# Patient Record
Sex: MALE | Race: WHITE | Employment: FULL TIME | URBAN - NONMETROPOLITAN AREA
[De-identification: names, ages, dates, MRNs, and addresses within clinical notes are randomized per-mention and may not be internally consistent; named-entity substitution may affect disease eponyms.]

---

## 2019-01-16 ENCOUNTER — OFFICE VISIT (OUTPATIENT)
Dept: FAMILY MEDICINE CLINIC | Age: 59
End: 2019-01-16
Payer: COMMERCIAL

## 2019-01-16 VITALS
SYSTOLIC BLOOD PRESSURE: 134 MMHG | DIASTOLIC BLOOD PRESSURE: 84 MMHG | WEIGHT: 181.4 LBS | HEIGHT: 71 IN | BODY MASS INDEX: 25.4 KG/M2 | HEART RATE: 62 BPM | RESPIRATION RATE: 16 BRPM

## 2019-01-16 DIAGNOSIS — Z23 NEED FOR TDAP VACCINATION: ICD-10-CM

## 2019-01-16 DIAGNOSIS — Z11.59 ENCOUNTER FOR HEPATITIS C SCREENING TEST FOR LOW RISK PATIENT: ICD-10-CM

## 2019-01-16 DIAGNOSIS — F17.210 CIGARETTE NICOTINE DEPENDENCE, UNCOMPLICATED: ICD-10-CM

## 2019-01-16 DIAGNOSIS — R07.9 CHEST PAIN, UNSPECIFIED TYPE: Primary | ICD-10-CM

## 2019-01-16 DIAGNOSIS — Z23 NEED FOR SHINGLES VACCINE: ICD-10-CM

## 2019-01-16 DIAGNOSIS — Z00.00 HEALTH CARE MAINTENANCE: ICD-10-CM

## 2019-01-16 DIAGNOSIS — Z87.891 PERSONAL HISTORY OF TOBACCO USE: ICD-10-CM

## 2019-01-16 DIAGNOSIS — Z82.49 FAMILY HISTORY OF CARDIOMYOPATHY: ICD-10-CM

## 2019-01-16 DIAGNOSIS — Z87.891 PERSONAL HISTORY OF TOBACCO USE, PRESENTING HAZARDS TO HEALTH: ICD-10-CM

## 2019-01-16 PROCEDURE — 93000 ELECTROCARDIOGRAM COMPLETE: CPT | Performed by: INTERNAL MEDICINE

## 2019-01-16 PROCEDURE — 99204 OFFICE O/P NEW MOD 45 MIN: CPT | Performed by: FAMILY MEDICINE

## 2019-01-16 PROCEDURE — 99406 BEHAV CHNG SMOKING 3-10 MIN: CPT | Performed by: FAMILY MEDICINE

## 2019-01-16 PROCEDURE — G0296 VISIT TO DETERM LDCT ELIG: HCPCS | Performed by: FAMILY MEDICINE

## 2019-01-16 RX ORDER — IBUPROFEN 200 MG
200 TABLET ORAL EVERY 6 HOURS PRN
COMMUNITY
End: 2021-08-19

## 2019-01-16 RX ORDER — VARENICLINE TARTRATE 25 MG
KIT ORAL
Qty: 1 EACH | Refills: 0 | Status: SHIPPED | OUTPATIENT
Start: 2019-01-16 | End: 2021-04-15

## 2019-01-16 ASSESSMENT — PATIENT HEALTH QUESTIONNAIRE - PHQ9
SUM OF ALL RESPONSES TO PHQ QUESTIONS 1-9: 0
2. FEELING DOWN, DEPRESSED OR HOPELESS: 0
1. LITTLE INTEREST OR PLEASURE IN DOING THINGS: 0
SUM OF ALL RESPONSES TO PHQ QUESTIONS 1-9: 0
SUM OF ALL RESPONSES TO PHQ9 QUESTIONS 1 & 2: 0

## 2019-01-18 ENCOUNTER — TELEPHONE (OUTPATIENT)
Dept: ONCOLOGY | Age: 59
End: 2019-01-18

## 2019-01-23 ENCOUNTER — TELEPHONE (OUTPATIENT)
Dept: FAMILY MEDICINE CLINIC | Age: 59
End: 2019-01-23

## 2019-01-23 DIAGNOSIS — Z82.49 FAMILY HISTORY OF CARDIOMYOPATHY: Primary | ICD-10-CM

## 2019-01-23 DIAGNOSIS — R07.9 CHEST PAIN, UNSPECIFIED TYPE: ICD-10-CM

## 2019-01-24 ENCOUNTER — HOSPITAL ENCOUNTER (OUTPATIENT)
Dept: CT IMAGING | Age: 59
Discharge: HOME | End: 2019-01-26

## 2019-01-24 DIAGNOSIS — Z87.891 PERSONAL HISTORY OF TOBACCO USE: ICD-10-CM

## 2019-01-24 PROCEDURE — G0297 LDCT FOR LUNG CA SCREEN: HCPCS

## 2019-01-26 ENCOUNTER — HOSPITAL ENCOUNTER (OUTPATIENT)
Dept: LAB | Age: 59
Discharge: HOME | End: 2019-01-26
Payer: COMMERCIAL

## 2019-01-26 DIAGNOSIS — Z00.00 HEALTH CARE MAINTENANCE: ICD-10-CM

## 2019-01-26 DIAGNOSIS — Z11.59 ENCOUNTER FOR HEPATITIS C SCREENING TEST FOR LOW RISK PATIENT: ICD-10-CM

## 2019-01-26 LAB
ALBUMIN SERPL-MCNC: 4 G/DL (ref 3.5–5.2)
ALBUMIN/GLOBULIN RATIO: 1.5 (ref 1–2.5)
ALP BLD-CCNC: 60 U/L (ref 40–129)
ALT SERPL-CCNC: 11 U/L (ref 5–41)
ANION GAP SERPL CALCULATED.3IONS-SCNC: 10 MMOL/L (ref 9–17)
AST SERPL-CCNC: 13 U/L
BILIRUB SERPL-MCNC: 0.31 MG/DL (ref 0.3–1.2)
BUN BLDV-MCNC: 12 MG/DL (ref 6–20)
BUN/CREAT BLD: 15 (ref 9–20)
CALCIUM SERPL-MCNC: 9.3 MG/DL (ref 8.6–10.4)
CHLORIDE BLD-SCNC: 108 MMOL/L (ref 98–107)
CHOLESTEROL, FASTING: 137 MG/DL
CHOLESTEROL/HDL RATIO: 3.5
CO2: 26 MMOL/L (ref 20–31)
CREAT SERPL-MCNC: 0.79 MG/DL (ref 0.7–1.2)
GFR AFRICAN AMERICAN: >60 ML/MIN
GFR NON-AFRICAN AMERICAN: >60 ML/MIN
GFR SERPL CREATININE-BSD FRML MDRD: ABNORMAL ML/MIN/{1.73_M2}
GFR SERPL CREATININE-BSD FRML MDRD: ABNORMAL ML/MIN/{1.73_M2}
GLUCOSE BLD-MCNC: 99 MG/DL (ref 70–99)
HDLC SERPL-MCNC: 39 MG/DL
HEPATITIS C ANTIBODY: NONREACTIVE
LDL CHOLESTEROL: 79 MG/DL (ref 0–130)
POTASSIUM SERPL-SCNC: 4.3 MMOL/L (ref 3.7–5.3)
SODIUM BLD-SCNC: 144 MMOL/L (ref 135–144)
TOTAL PROTEIN: 6.6 G/DL (ref 6.4–8.3)
TRIGLYCERIDE, FASTING: 93 MG/DL
VLDLC SERPL CALC-MCNC: ABNORMAL MG/DL (ref 1–30)

## 2019-01-26 PROCEDURE — 86803 HEPATITIS C AB TEST: CPT

## 2019-01-26 PROCEDURE — 80061 LIPID PANEL: CPT

## 2019-01-26 PROCEDURE — 36415 COLL VENOUS BLD VENIPUNCTURE: CPT

## 2019-01-26 PROCEDURE — 80053 COMPREHEN METABOLIC PANEL: CPT

## 2019-02-01 ENCOUNTER — OFFICE VISIT (OUTPATIENT)
Dept: PRIMARY CARE CLINIC | Age: 59
End: 2019-02-01
Payer: COMMERCIAL

## 2019-02-01 VITALS
BODY MASS INDEX: 25.97 KG/M2 | SYSTOLIC BLOOD PRESSURE: 136 MMHG | OXYGEN SATURATION: 97 % | HEIGHT: 70 IN | TEMPERATURE: 100.2 F | WEIGHT: 181.4 LBS | HEART RATE: 70 BPM | DIASTOLIC BLOOD PRESSURE: 82 MMHG

## 2019-02-01 DIAGNOSIS — R50.9 FEVER, UNSPECIFIED FEVER CAUSE: ICD-10-CM

## 2019-02-01 DIAGNOSIS — J11.1 INFLUENZA-LIKE ILLNESS: Primary | ICD-10-CM

## 2019-02-01 LAB
INFLUENZA A ANTIBODY: NEGATIVE
INFLUENZA B ANTIBODY: NEGATIVE

## 2019-02-01 PROCEDURE — 99213 OFFICE O/P EST LOW 20 MIN: CPT | Performed by: NURSE PRACTITIONER

## 2019-02-01 PROCEDURE — 87804 INFLUENZA ASSAY W/OPTIC: CPT | Performed by: NURSE PRACTITIONER

## 2019-02-01 ASSESSMENT — ENCOUNTER SYMPTOMS
SORE THROAT: 0
ABDOMINAL PAIN: 0
NAUSEA: 0
FLU SYMPTOMS: 1
VOMITING: 0
RESPIRATORY NEGATIVE: 1
GASTROINTESTINAL NEGATIVE: 1
COUGH: 0

## 2019-02-06 ENCOUNTER — OFFICE VISIT (OUTPATIENT)
Dept: CARDIOLOGY | Age: 59
End: 2019-02-06
Payer: COMMERCIAL

## 2019-02-06 VITALS
SYSTOLIC BLOOD PRESSURE: 112 MMHG | BODY MASS INDEX: 25.77 KG/M2 | DIASTOLIC BLOOD PRESSURE: 70 MMHG | WEIGHT: 180 LBS | HEIGHT: 70 IN | HEART RATE: 68 BPM

## 2019-02-06 DIAGNOSIS — Z82.49 FAMILY HISTORY OF CARDIOMYOPATHY: ICD-10-CM

## 2019-02-06 DIAGNOSIS — F17.200 SMOKING: ICD-10-CM

## 2019-02-06 DIAGNOSIS — R06.09 DYSPNEA ON EXERTION: Primary | ICD-10-CM

## 2019-02-06 PROCEDURE — 99244 OFF/OP CNSLTJ NEW/EST MOD 40: CPT | Performed by: INTERNAL MEDICINE

## 2019-03-20 ENCOUNTER — HOSPITAL ENCOUNTER (OUTPATIENT)
Dept: NON INVASIVE DIAGNOSTICS | Age: 59
Discharge: HOME | End: 2019-03-20
Payer: COMMERCIAL

## 2019-03-20 ENCOUNTER — HOSPITAL ENCOUNTER (OUTPATIENT)
Dept: NUCLEAR MEDICINE | Age: 59
Discharge: HOME | End: 2019-03-22
Payer: COMMERCIAL

## 2019-03-20 ENCOUNTER — HOSPITAL ENCOUNTER (OUTPATIENT)
Dept: NUCLEAR MEDICINE | Age: 59
End: 2019-03-20
Payer: COMMERCIAL

## 2019-03-20 DIAGNOSIS — R06.09 DYSPNEA ON EXERTION: ICD-10-CM

## 2019-03-20 LAB
LV EF: 55 %
LVEF MODALITY: NORMAL

## 2019-03-20 PROCEDURE — 93017 CV STRESS TEST TRACING ONLY: CPT

## 2019-03-20 PROCEDURE — A9500 TC99M SESTAMIBI: HCPCS | Performed by: INTERNAL MEDICINE

## 2019-03-20 PROCEDURE — 3430000000 HC RX DIAGNOSTIC RADIOPHARMACEUTICAL: Performed by: INTERNAL MEDICINE

## 2019-03-20 PROCEDURE — 78452 HT MUSCLE IMAGE SPECT MULT: CPT

## 2019-03-20 PROCEDURE — 93306 TTE W/DOPPLER COMPLETE: CPT | Performed by: INTERNAL MEDICINE

## 2019-03-20 PROCEDURE — 93018 CV STRESS TEST I&R ONLY: CPT | Performed by: INTERNAL MEDICINE

## 2019-03-20 PROCEDURE — 93306 TTE W/DOPPLER COMPLETE: CPT

## 2019-03-20 RX ADMIN — TETRAKIS(2-METHOXYISOBUTYLISOCYANIDE)COPPER(I) TETRAFLUOROBORATE 30 MILLICURIE: 1 INJECTION, POWDER, LYOPHILIZED, FOR SOLUTION INTRAVENOUS at 11:49

## 2019-03-20 RX ADMIN — TETRAKIS(2-METHOXYISOBUTYLISOCYANIDE)COPPER(I) TETRAFLUOROBORATE 10 MILLICURIE: 1 INJECTION, POWDER, LYOPHILIZED, FOR SOLUTION INTRAVENOUS at 11:49

## 2019-04-24 ENCOUNTER — OFFICE VISIT (OUTPATIENT)
Dept: CARDIOLOGY | Age: 59
End: 2019-04-24
Payer: COMMERCIAL

## 2019-04-24 VITALS
DIASTOLIC BLOOD PRESSURE: 82 MMHG | HEIGHT: 70 IN | HEART RATE: 68 BPM | BODY MASS INDEX: 26.48 KG/M2 | WEIGHT: 185 LBS | SYSTOLIC BLOOD PRESSURE: 134 MMHG

## 2019-04-24 DIAGNOSIS — R06.09 DYSPNEA ON EXERTION: Primary | ICD-10-CM

## 2019-04-24 PROCEDURE — 99213 OFFICE O/P EST LOW 20 MIN: CPT | Performed by: INTERNAL MEDICINE

## 2019-04-24 NOTE — PROGRESS NOTES
estimated EF > 55%. No segmental wall motion abnormalities seen. No doppler evidence of diastolic dysfunction. No significant valvular regurgitation or stenosis seen. No pericardial effusion. Nuclear stress test 3/20/19  IMPRESSION:  1. No ischemia or infarction. 2. Normal LV systolic function. LVEF 58%. Assessment and plan:    -Dyspnea on exertion and family history of cardiomyopathy. Nuclear stress test was normal 3/20/19. TTE 3/20/19 showed preserved LV systolic function with no significant valve issues.   -Chronic smoking- Counseled to quit smoking. Patient to have pulmonary evaluation by PCP  -RTC 12 months.       Leah Lopez [facility]           [phone]

## 2020-02-05 ENCOUNTER — TELEPHONE (OUTPATIENT)
Dept: ONCOLOGY | Age: 60
End: 2020-02-05

## 2020-02-05 NOTE — TELEPHONE ENCOUNTER
Working on Lung Nodule Follow up Report. Chart reviewed. Our records indicate that your patient is due for their annual lung cancer screening follow up testing. For your convenience, we have pended the order for the scan for you. If you do not agree with the need for the test, please cancel the order and let us know.      Sincerely,    [address] [facility]

## 2020-02-05 NOTE — LETTER
2/5/2020        [address] [address]    Dear Rina Apple:    Your healthcare provider has ordered a low dose CT scan of the chest for lung cancer screening. You will find enclosed, information about CT lung screening. Please review the statement of understanding, you will be asked to sign a copy of this at the time of your CT scan    If you have not already been contacted to make the appointment for your scan, please call our scheduling department at [phone]    Keep in mind that CT lung screening does not take the place of smoking cessation. If you are a current smoker, you will find enclosed smoking cessation resources. Please do not hesitate to contact me if you have any questions or concerns.     [address] [place] [address],      [facility] Lung Screening Program  [phone]

## 2020-02-14 ENCOUNTER — HOSPITAL ENCOUNTER (OUTPATIENT)
Dept: CT IMAGING | Age: 60
Discharge: HOME | End: 2020-02-16
Payer: COMMERCIAL

## 2020-02-14 PROCEDURE — G0297 LDCT FOR LUNG CA SCREEN: HCPCS

## 2020-04-23 ENCOUNTER — TELEMEDICINE (OUTPATIENT)
Dept: CARDIOLOGY | Age: 60
End: 2020-04-23
Payer: COMMERCIAL

## 2020-04-23 PROCEDURE — 99214 OFFICE O/P EST MOD 30 MIN: CPT | Performed by: INTERNAL MEDICINE

## 2020-04-23 NOTE — PROGRESS NOTES
Abnormal   [x] Mouth/Throat: Mucous membranes are moist.     External Ears [x] Normal  [] Abnormal-     Neck: [x] No visualized mass     Pulmonary/Chest: [x] Respiratory effort normal.  [x] No visualized signs of difficulty breathing or respiratory distress        [] Abnormal-      Musculoskeletal:   [x] Normal gait with no signs of ataxia         [x] Normal range of motion of neck        [] Abnormal-       Neurological:        [x] No Facial Asymmetry (Cranial nerve 7 motor function) (limited exam to video visit)          [x] No gaze palsy        [] Abnormal-         Skin:        [x] No significant exanthematous lesions or discoloration noted on facial skin         [] Abnormal-            Psychiatric:       [x] Normal Affect [x] No Hallucinations        [] Abnormal-     Other pertinent observable physical exam findings-       ASSESSMENT/PLAN:    Dyspnea on exertion and family history of cardiomyopathy. Resolved. Nuclear stress test was normal 3/20/19. TTE 3/20/19 showed preserved LV systolic function with no significant valve issues. Chronic smoking- Counseled to quit smoking. Patient to have pulmonary evaluation by PCP. Trying to quit  RTC 12 months. Joseph Pena is a 63 y.o. male being evaluated by a Virtual Visit (video visit) encounter to address concerns as mentioned above. A caregiver was present when appropriate. Due to this being a TeleHealth encounter (During [facility] public health emergency), evaluation of the following organ systems was limited: Vitals/Constitutional/EENT/Resp/CV/GI//MS/Neuro/Skin/Heme-Lymph-Imm. Pursuant to the emergency declaration under the [address] [place] [address] authority and the [employer] Act, this Virtual Visit was conducted with patient's (and/or legal guardian's) consent, to reduce the patient's risk of exposure to COVID-19 and provide necessary medical care.   The patient (and/or legal guardian) has also been advised to contact this office for worsening conditions or problems, and seek emergency medical treatment and/or call 911 if deemed necessary. Services were provided through a video synchronous discussion virtually to substitute for in-person clinic visit. Patient and provider were located at their individual homes. --Rach Torres DO on 4/23/2020 at 9:12 AM    An electronic signature was used to authenticate this note.

## 2021-03-29 ENCOUNTER — PATIENT MESSAGE (OUTPATIENT)
Dept: FAMILY MEDICINE CLINIC | Age: 61
End: 2021-03-29

## 2021-03-29 DIAGNOSIS — Z87.891 PERSONAL HISTORY OF NICOTINE DEPENDENCE: Primary | ICD-10-CM

## 2021-03-30 NOTE — TELEPHONE ENCOUNTER
From: Liss Spencer  To: Nelsy Quintero MD  Sent: 3/29/2021 6:10 PM EDT  Subject: Non-Urgent Medical Question    I received a letter that it is time for my lung screening.  Can Dr Destini Riojas call this in please

## 2021-04-05 ENCOUNTER — TELEPHONE (OUTPATIENT)
Dept: ONCOLOGY | Age: 61
End: 2021-04-05

## 2021-04-05 NOTE — LETTER
4/5/2021        [address], [facility].    Dear Jennifer Rudd:    Your healthcare provider has ordered a low dose CT scan of the chest for lung cancer screening. You will find enclosed, information about CT lung screening. Please review the statement of understanding, you will be asked to sign a copy of this at the time of your CT scan    If you have not already been contacted to make the appointment for your scan, please call our scheduling department at [phone]    Keep in mind that CT lung screening does not take the place of smoking cessation. If you are a current smoker, you will find enclosed smoking cessation resources. Please do not hesitate to contact me if you have any questions or concerns.     [facility],      [facility] Lung Screening Program  [phone]

## 2021-04-09 ENCOUNTER — HOSPITAL ENCOUNTER (OUTPATIENT)
Dept: CT IMAGING | Age: 61
Discharge: HOME | End: 2021-04-11
Payer: COMMERCIAL

## 2021-04-09 DIAGNOSIS — Z87.891 PERSONAL HISTORY OF NICOTINE DEPENDENCE: ICD-10-CM

## 2021-04-09 PROCEDURE — 71271 CT THORAX LUNG CANCER SCR C-: CPT

## 2021-04-15 ENCOUNTER — OFFICE VISIT (OUTPATIENT)
Dept: FAMILY MEDICINE CLINIC | Age: 61
End: 2021-04-15
Payer: COMMERCIAL

## 2021-04-15 VITALS
RESPIRATION RATE: 16 BRPM | WEIGHT: 203.6 LBS | SYSTOLIC BLOOD PRESSURE: 128 MMHG | HEART RATE: 82 BPM | HEIGHT: 71 IN | BODY MASS INDEX: 28.5 KG/M2 | DIASTOLIC BLOOD PRESSURE: 86 MMHG

## 2021-04-15 DIAGNOSIS — R10.10 PAIN OF UPPER ABDOMEN: ICD-10-CM

## 2021-04-15 DIAGNOSIS — Z00.00 HEALTHCARE MAINTENANCE: ICD-10-CM

## 2021-04-15 DIAGNOSIS — Z00.00 ANNUAL VISIT FOR GENERAL ADULT MEDICAL EXAMINATION WITHOUT ABNORMAL FINDINGS: Primary | ICD-10-CM

## 2021-04-15 PROBLEM — Z87.891 HISTORY OF TOBACCO USE: Status: ACTIVE | Noted: 2019-01-16

## 2021-04-15 PROCEDURE — 99214 OFFICE O/P EST MOD 30 MIN: CPT | Performed by: FAMILY MEDICINE

## 2021-04-15 PROCEDURE — 93000 ELECTROCARDIOGRAM COMPLETE: CPT | Performed by: FAMILY MEDICINE

## 2021-04-15 RX ORDER — OMEPRAZOLE 20 MG/1
20 CAPSULE, DELAYED RELEASE ORAL
Qty: 30 CAPSULE | Refills: 0 | Status: SHIPPED | OUTPATIENT
Start: 2021-04-15

## 2021-04-15 ASSESSMENT — PATIENT HEALTH QUESTIONNAIRE - PHQ9
SUM OF ALL RESPONSES TO PHQ QUESTIONS 1-9: 0
SUM OF ALL RESPONSES TO PHQ9 QUESTIONS 1 & 2: 0

## 2021-04-15 NOTE — PROGRESS NOTES
[address] [place], [address]                        Telephone [phone]             Fax [phone]  1960  MRN:  [id number]  Date of visit:  4/15/2021    Subjective:    Kathy Sesay is a 62 y.o. male who presents to [facility] today (4/15/2021) for follow up/evaluation of: Annual Exam and Abdominal Pain    He is here today for an annual exam.  He reports intermittent abdominal pain in the upper abdomen. He reports an aching sensation that occurs before a more intense pain starts. He gets diaphoretic when he has the pain. The pain will last from 5 minutes to 60 minutes. He occasionally has nausea and shortness of breath associated with the pain. He is uncertain if it associated with eating. It occurs more often with exertion, or when the weather is warmer. The last episode occurred on Easter. He had CT scan of his lungs 04/09/2021 per lung cancer screening protocol. The CT showed a hiatal hernia. He reports occasional GERD symptoms. He uses Pepcid prn. He has quit smoking. There is a family history of heart disease. He had a brother that had an MI at age 37. Danny had a nuclear stress test 3/20/2019 that was normal.  He has received both doses of the [employer] vaccine.     He has the following problem list:  Patient Active Problem List   Diagnosis    Tobacco abuse    Cigarette nicotine dependence, uncomplicated    Family history of cardiomyopathy    Dyspnea on exertion        Current medications are:  Outpatient Medications Marked as Taking for the 4/15/21 encounter (Office Visit) with Carolynne Severance, MD   Medication Sig Dispense Refill    aspirin 81 MG tablet Take 81 mg by mouth daily      ibuprofen (ADVIL;MOTRIN) 200 MG tablet Take 200 mg by mouth every 6 hours as needed for Pain      FAMOTIDINE PO Take by mouth as needed He is allergic to sulfa antibiotics. He is not currently a smoker. He  reports that he has quit smoking. His smoking use included cigarettes. He has a 30.00 pack-year smoking history. He quit smokeless tobacco use about 4 months ago. Objective:    Vitals:    04/15/21 1631   BP: 128/86   Site: Right Upper Arm   Position: Sitting   Cuff Size: Large Adult   Pulse: 82   Resp: 16   Weight: 203 lb 9.6 oz (92.4 kg)   Height: 5' 11\" (1.803 m)     Body mass index is 28.4 kg/m². Overweight male, healthy-appearing, alert, cooperative and in no acute distress. Neck supple. No adenopathy. Thyroid symmetric, normal size. Chest:  Normal expansion. Clear to auscultation. No rales, rhonchi, or wheezing. Heart sounds are normal.  Regular rate and rhythm without murmur, gallop or rub. There is mild tenderness to palpation of the chest wall. Abdomen is soft, with mild epigastric tenderness to palpation. There is no rebound or guarding. There are no masses palpated. Lower extremities have no edema. He has had no recent labs. ECG done today showed sinus bradycardia with a rate of 56. Assessment and Plan:    1. Annual visit for general adult medical examination without abnormal findings  Health maintenance was reviewed with the patient. He has received both doses of the [employer] Covid-19 vaccine. Lipid panel and fasting comprehensive metabolic panel were recommended and ordered. He is due for Pneumovax, Tdap, and Shingrix, but he just received the second dose of his Covid-19 vaccine. HIV screening was recommended and declined. All other health maintenance is up to date at this time. There is no indication for Prevnar-13 at this time. 2. Pain of upper abdomen  Angina vs. GERD vs. other  I reviewed the ECG done today with the patient. I have recommended follow up with cardiology. He has an appointment scheduled on 5/5/2021.   He was advised to go to the ER if he has severe pain, or pain associated

## 2021-04-15 NOTE — PATIENT INSTRUCTIONS
Patient Education        Chest Pain: Care Instructions  Your Care Instructions     There are many things that can cause chest pain. Some are not serious and will get better on their own in a few days. But some kinds of chest pain need more testing and treatment. Your doctor may have recommended a follow-up visit in the next 8 to 12 hours. If you are not getting better, you may need more tests or treatment. Even though your doctor has released you, you still need to watch for any problems. The doctor carefully checked you, but sometimes problems can develop later. If you have new symptoms or if your symptoms do not get better, get medical care right away. If you have worse or different chest pain or pressure that lasts more than 5 minutes or you passed out (lost consciousness), call 911 or seek other emergency help right away. A medical visit is only one step in your treatment. Even if you feel better, you still need to do what your doctor recommends, such as going to all suggested follow-up appointments and taking medicines exactly as directed. This will help you recover and help prevent future problems. How can you care for yourself at home? · Rest until you feel better. · Take your medicine exactly as prescribed. Call your doctor if you think you are having a problem with your medicine. · Do not drive after taking a prescription pain medicine. When should you call for help? Call 911 if:     · You passed out (lost consciousness).     · You have severe difficulty breathing.     · You have symptoms of a heart attack. These may include:  ? Chest pain or pressure, or a strange feeling in your chest.  ? Sweating. ? Shortness of breath. ? Nausea or vomiting. ? Pain, pressure, or a strange feeling in your back, neck, jaw, or upper belly or in one or both shoulders or arms. ? Lightheadedness or sudden weakness. ? A fast or irregular heartbeat.   After you call 911, the  may tell you to chew 1

## 2021-04-24 ENCOUNTER — HOSPITAL ENCOUNTER (OUTPATIENT)
Dept: LAB | Age: 61
Discharge: HOME | End: 2021-04-24
Payer: COMMERCIAL

## 2021-04-24 DIAGNOSIS — Z00.00 HEALTHCARE MAINTENANCE: ICD-10-CM

## 2021-04-24 LAB
ALBUMIN SERPL-MCNC: 4.4 G/DL (ref 3.5–5.2)
ALBUMIN/GLOBULIN RATIO: 1.5 (ref 1–2.5)
ALP BLD-CCNC: 83 U/L (ref 40–129)
ALT SERPL-CCNC: 16 U/L (ref 5–41)
ANION GAP SERPL CALCULATED.3IONS-SCNC: 8 MMOL/L (ref 9–17)
AST SERPL-CCNC: 16 U/L
BILIRUB SERPL-MCNC: 0.41 MG/DL (ref 0.3–1.2)
BUN BLDV-MCNC: 16 MG/DL (ref 6–20)
BUN/CREAT BLD: 20 (ref 9–20)
CALCIUM SERPL-MCNC: 9.8 MG/DL (ref 8.6–10.4)
CHLORIDE BLD-SCNC: 106 MMOL/L (ref 98–107)
CHOLESTEROL, FASTING: 161 MG/DL
CHOLESTEROL/HDL RATIO: 3.4
CO2: 27 MMOL/L (ref 20–31)
CREAT SERPL-MCNC: 0.79 MG/DL (ref 0.7–1.2)
GFR AFRICAN AMERICAN: >60 ML/MIN
GFR NON-AFRICAN AMERICAN: >60 ML/MIN
GFR SERPL CREATININE-BSD FRML MDRD: ABNORMAL ML/MIN/{1.73_M2}
GFR SERPL CREATININE-BSD FRML MDRD: ABNORMAL ML/MIN/{1.73_M2}
GLUCOSE BLD-MCNC: 95 MG/DL (ref 70–99)
HDLC SERPL-MCNC: 48 MG/DL
LDL CHOLESTEROL: 86 MG/DL (ref 0–130)
POTASSIUM SERPL-SCNC: 4.7 MMOL/L (ref 3.7–5.3)
SODIUM BLD-SCNC: 141 MMOL/L (ref 135–144)
TOTAL PROTEIN: 7.3 G/DL (ref 6.4–8.3)
TRIGLYCERIDE, FASTING: 135 MG/DL
VLDLC SERPL CALC-MCNC: NORMAL MG/DL (ref 1–30)

## 2021-04-24 PROCEDURE — 80061 LIPID PANEL: CPT

## 2021-04-24 PROCEDURE — 36415 COLL VENOUS BLD VENIPUNCTURE: CPT

## 2021-04-24 PROCEDURE — 80053 COMPREHEN METABOLIC PANEL: CPT

## 2021-05-05 ENCOUNTER — OFFICE VISIT (OUTPATIENT)
Dept: CARDIOLOGY | Age: 61
End: 2021-05-05
Payer: COMMERCIAL

## 2021-05-05 VITALS
DIASTOLIC BLOOD PRESSURE: 99 MMHG | SYSTOLIC BLOOD PRESSURE: 145 MMHG | BODY MASS INDEX: 28.42 KG/M2 | WEIGHT: 203 LBS | HEART RATE: 72 BPM | HEIGHT: 71 IN

## 2021-05-05 DIAGNOSIS — R06.09 DYSPNEA ON EXERTION: Primary | ICD-10-CM

## 2021-05-05 DIAGNOSIS — F17.200 SMOKING: ICD-10-CM

## 2021-05-05 DIAGNOSIS — I10 ESSENTIAL HYPERTENSION: ICD-10-CM

## 2021-05-05 DIAGNOSIS — I42.9 CARDIOMYOPATHY, UNSPECIFIED TYPE (HCC): ICD-10-CM

## 2021-05-05 PROCEDURE — 93000 ELECTROCARDIOGRAM COMPLETE: CPT | Performed by: INTERNAL MEDICINE

## 2021-05-05 PROCEDURE — 99214 OFFICE O/P EST MOD 30 MIN: CPT | Performed by: INTERNAL MEDICINE

## 2021-05-05 RX ORDER — LISINOPRIL 10 MG/1
10 TABLET ORAL DAILY
Qty: 30 TABLET | Refills: 5 | Status: SHIPPED | OUTPATIENT
Start: 2021-05-05 | End: 2021-09-24 | Stop reason: SDUPTHER

## 2021-05-05 NOTE — PROGRESS NOTES
level, No change in activity level. · Eyes: No visual changes or diplopia. No scleral icterus. · ENT: No Headaches, hearing loss or vertigo. No mouth sores or sore throat. · Cardiovascular: see above  · Respiratory: see above  · Gastrointestinal: No abdominal pain, appetite loss, blood in stools. · Genitourinary: No dysuria, trouble voiding, or hematuria. · Musculoskeletal:  No gait disturbance, No weakness or joint complaints. · Integumentary: No rash or pruritis. · Neurological: No headache or diplopia. No tingling  · Psychiatric: No anxiety, or depression. · Endocrine: No temperature intolerance. · Hematologic/Lymphatic: No abnormal bruising or bleeding, blood clots or swollen lymph nodes. · Allergic/Immunologic: No nasal congestion or hives. PHYSICAL EXAM:      Vitals:    05/05/21 0938   BP: (!) 164/94   Pulse: 70       Constitutional and General Appearance: alert, cooperative, no distress and appears stated age  HEENT: PERRL, no cervical lymphadenopathy. No masses palpable. Normal oral mucosa  Respiratory:  · Normal excursion and expansion without use of accessory muscles  · Resp Auscultation: Good respiratory effort. No for increased work of breathing. On auscultation: clear to auscultation bilaterally  Cardiovascular:  · Heart tones are crisp and normal. regular S1 and S2.  · Jugular venous pulsation Normal  · The carotid upstroke is normal in amplitude and contour without delay or bruit   Abdomen:   · soft  · Bowel sounds present  Extremities:  ·  No edema  Neurological:  · Alert and oriented. Cardiac Data:  EKG: NSR, NL ECG    Labs:     CBC: No results for input(s): WBC, HGB, HCT, PLT in the last 72 hours. BMP: No results for input(s): NA, K, CO2, BUN, CREATININE, LABGLOM, GLUCOSE in the last 72 hours. PT/INR: No results for input(s): PROTIME, INR in the last 72 hours.   FASTING LIPID PANEL:  Lab Results   Component Value Date    HDL 48 04/24/2021     LIVER PROFILE:No results for input(s): AST, ALT, LABALBU in the last 72 hours. TTE 3/20/19  Summary  Normal left ventricle size, wall thickness and function with an estimated EF > 55%. No segmental wall motion abnormalities seen. No doppler evidence of diastolic dysfunction. No significant valvular regurgitation or stenosis seen. No pericardial effusion.     Nuclear stress test 3/20/19  IMPRESSION:  1. No ischemia or infarction. 2. Normal LV systolic function. LVEF 58%.       Assessment and plan:     -Dyspnea on exertion and family history of cardiomyopathy. I will obtain treadmill cardiolite stress test to evaluate his symptoms. I will also obtain TTE to evaluate for LV function, valve abnormalities and pulmonary hypertension. I will also obtain PFT due to history of smoking. -HTN- will start lisinopril 10mg po qday. Patient to monitor BP at home. Side effects discussed. -GERD- on PPI  -Chronic smoking- he stopped smoking Thanksgiving 2020.    -I counseled him extensively towards symptoms for which he will need to seek emergent medical attention.   -RTC 6 weeks    Leah Obrien [facility]           [phone]

## 2021-05-12 ENCOUNTER — PRE-PROCEDURE TELEPHONE (OUTPATIENT)
Dept: PREADMISSION TESTING | Age: 61
End: 2021-05-12

## 2021-05-20 ENCOUNTER — HOSPITAL ENCOUNTER (OUTPATIENT)
Dept: PREADMISSION TESTING | Age: 61
Setting detail: SPECIMEN
Discharge: HOME | End: 2021-05-24
Payer: COMMERCIAL

## 2021-05-20 DIAGNOSIS — Z11.59 ENCOUNTER FOR SCREENING FOR OTHER VIRAL DISEASES: ICD-10-CM

## 2021-05-25 ENCOUNTER — HOSPITAL ENCOUNTER (OUTPATIENT)
Dept: PULMONOLOGY | Age: 61
Discharge: HOME | End: 2021-05-25
Payer: COMMERCIAL

## 2021-05-25 DIAGNOSIS — R06.09 DYSPNEA ON EXERTION: ICD-10-CM

## 2021-05-25 DIAGNOSIS — F17.200 SMOKING: ICD-10-CM

## 2021-05-25 PROCEDURE — 94060 EVALUATION OF WHEEZING: CPT

## 2021-05-25 PROCEDURE — 94726 PLETHYSMOGRAPHY LUNG VOLUMES: CPT

## 2021-05-25 PROCEDURE — 94640 AIRWAY INHALATION TREATMENT: CPT

## 2021-05-25 PROCEDURE — 6370000000 HC RX 637 (ALT 250 FOR IP): Performed by: INTERNAL MEDICINE

## 2021-05-25 PROCEDURE — 94729 DIFFUSING CAPACITY: CPT

## 2021-05-25 RX ORDER — ALBUTEROL SULFATE 90 UG/1
4 AEROSOL, METERED RESPIRATORY (INHALATION) ONCE
Status: COMPLETED | OUTPATIENT
Start: 2021-05-25 | End: 2021-05-25

## 2021-05-25 RX ADMIN — ALBUTEROL SULFATE 4 PUFF: 90 AEROSOL, METERED RESPIRATORY (INHALATION) at 16:13

## 2021-05-27 NOTE — PROCEDURES
[facility] [address]                 [address] [place] [address]                               PULMONARY FUNCTION    PATIENT NAME: Alice Jameson                      :        1960  MED REC NO:   [id number]                             ROOM:  ACCOUNT NO:   [de-identified]                           ADMIT DATE: 2021  PROVIDER:     Nathalie Goldman    DATE OF PROCEDURE:  2021    The patient's spirometry shows a mild obstructive and restrictive  pattern. FEV1 69% predicted with 1% bronchodilator change to 71%  predicted, FVC 71% predicted with 6% bronchodilator change to 75%  predicted. FEV1/FVC ratio is 76, postbronchodilator is 72. FEF 25-75 is 67% predicted with 1% bronchodilator change to 68%  predicted. Total lung capacity by body box 95% predicted and RV is 154% predicted  consistent with air trapping. Diffusion capacity uncorrected 76% predicted, corrected for alveolar  volume is 89% predicted. Airway resistance is normal.    FINAL IMPRESSION:  The study shows a combined obstructive and  restrictive ventilatory dysfunction of moderate severity without a  significant bronchodilator response. The restrictive process is  extrapulmonic. Clinical correlation advised.         Shelly Summers    D: 2021 10:06:52       T: 2021 18:10:07     [email or web address]  Job#: [id number]     Doc#: [id number]    CC:  Olga Love MD

## 2021-05-28 NOTE — RESULT ENCOUNTER NOTE
Please advise the patient that the PFTs did not show any significant issue with the lungs. The hiatal hernia may be contributing to shortness of breath. I recommend follow up with a surgeon. He should complete cardiology evaluation also. (He saw Dr. Liliana Mccoy 5/5/2021, and a stress test was ordered. )

## 2021-06-02 ENCOUNTER — TELEPHONE (OUTPATIENT)
Dept: FAMILY MEDICINE CLINIC | Age: 61
End: 2021-06-02

## 2021-06-02 DIAGNOSIS — K44.9 HIATAL HERNIA: Primary | ICD-10-CM

## 2021-06-09 ENCOUNTER — HOSPITAL ENCOUNTER (OUTPATIENT)
Dept: LAB | Age: 61
Discharge: HOME | End: 2021-06-09
Payer: COMMERCIAL

## 2021-06-09 ENCOUNTER — INITIAL CONSULT (OUTPATIENT)
Dept: SURGERY | Age: 61
End: 2021-06-09
Payer: COMMERCIAL

## 2021-06-09 ENCOUNTER — TELEPHONE (OUTPATIENT)
Dept: SURGERY | Age: 61
End: 2021-06-09

## 2021-06-09 VITALS
HEART RATE: 65 BPM | HEIGHT: 71 IN | DIASTOLIC BLOOD PRESSURE: 80 MMHG | WEIGHT: 202 LBS | BODY MASS INDEX: 28.28 KG/M2 | TEMPERATURE: 98.2 F | SYSTOLIC BLOOD PRESSURE: 118 MMHG | OXYGEN SATURATION: 96 %

## 2021-06-09 DIAGNOSIS — R53.83 FATIGUE, UNSPECIFIED TYPE: ICD-10-CM

## 2021-06-09 DIAGNOSIS — R13.14 PHARYNGOESOPHAGEAL DYSPHAGIA: Primary | ICD-10-CM

## 2021-06-09 DIAGNOSIS — R06.09 DYSPNEA ON EXERTION: ICD-10-CM

## 2021-06-09 DIAGNOSIS — R10.13 EPIGASTRIC PAIN: ICD-10-CM

## 2021-06-09 DIAGNOSIS — Z87.891 HISTORY OF TOBACCO USE: ICD-10-CM

## 2021-06-09 DIAGNOSIS — R07.9 CHEST PAIN, UNSPECIFIED TYPE: ICD-10-CM

## 2021-06-09 DIAGNOSIS — R13.14 PHARYNGOESOPHAGEAL DYSPHAGIA: ICD-10-CM

## 2021-06-09 LAB
ABSOLUTE EOS #: 0.29 K/UL (ref 0–0.44)
ABSOLUTE IMMATURE GRANULOCYTE: 0.04 K/UL (ref 0–0.3)
ABSOLUTE LYMPH #: 2.93 K/UL (ref 1.1–3.7)
ABSOLUTE MONO #: 0.93 K/UL (ref 0.1–1.2)
BASOPHILS # BLD: 0 % (ref 0–2)
BASOPHILS ABSOLUTE: <0.03 K/UL (ref 0–0.2)
DIFFERENTIAL TYPE: NORMAL
EOSINOPHILS RELATIVE PERCENT: 3 % (ref 1–4)
HCT VFR BLD CALC: 42.7 % (ref 40.7–50.3)
HEMOGLOBIN: 14.2 G/DL (ref 13–17)
IMMATURE GRANULOCYTES: 0 %
LYMPHOCYTES # BLD: 31 % (ref 24–43)
MCH RBC QN AUTO: 29.3 PG (ref 25.2–33.5)
MCHC RBC AUTO-ENTMCNC: 33.3 G/DL (ref 25.2–33.5)
MCV RBC AUTO: 88.2 FL (ref 82.6–102.9)
MONOCYTES # BLD: 10 % (ref 3–12)
NRBC AUTOMATED: 0 PER 100 WBC
PDW BLD-RTO: 13.2 % (ref 11.8–14.4)
PLATELET # BLD: 184 K/UL (ref 138–453)
PLATELET ESTIMATE: NORMAL
PMV BLD AUTO: 11.3 FL (ref 8.1–13.5)
RBC # BLD: 4.84 M/UL (ref 4.21–5.77)
RBC # BLD: NORMAL 10*6/UL
SEG NEUTROPHILS: 56 % (ref 36–65)
SEGMENTED NEUTROPHILS ABSOLUTE COUNT: 5.12 K/UL (ref 1.5–8.1)
WBC # BLD: 9.3 K/UL (ref 3.5–11.3)
WBC # BLD: NORMAL 10*3/UL

## 2021-06-09 PROCEDURE — 85025 COMPLETE CBC W/AUTO DIFF WBC: CPT

## 2021-06-09 PROCEDURE — 99204 OFFICE O/P NEW MOD 45 MIN: CPT | Performed by: SURGERY

## 2021-06-09 PROCEDURE — 36415 COLL VENOUS BLD VENIPUNCTURE: CPT

## 2021-06-09 ASSESSMENT — ENCOUNTER SYMPTOMS
BACK PAIN: 0
TROUBLE SWALLOWING: 1
COUGH: 0
HEMATOCHEZIA: 0
EYES NEGATIVE: 1
NAUSEA: 1
SHORTNESS OF BREATH: 1
VOMITING: 0
SORE THROAT: 0
HEARTBURN: 1
CHOKING: 0
ABDOMINAL PAIN: 1
DIARRHEA: 1
WHEEZING: 0
VOICE CHANGE: 0

## 2021-06-09 NOTE — TELEPHONE ENCOUNTER
Provider   lisinopril (PRINIVIL;ZESTRIL) 10 MG tablet Take 1 tablet by mouth daily 5/5/21   Elijah Mcmillan MD   omeprazole (PRILOSEC) 20 MG delayed release capsule Take 1 capsule by mouth every morning (before breakfast) 4/15/21   Royer Mcgraw MD   aspirin 81 MG tablet Take 81 mg by mouth daily    Historical Provider, MD   ibuprofen (ADVIL;MOTRIN) 200 MG tablet Take 200 mg by mouth every 6 hours as needed for Pain    Historical Provider, MD     Vital Signs (Current) [unfilled]    Weight:   Wt Readings from Last 1 Encounters:   05/05/21 203 lb (92.1 kg)     Height:   Ht Readings from Last 1 Encounters:   05/05/21 5' 11\" (1.803 m)      BMI:  There is no height or weight on file to calculate BMI. Estimated body mass index is 28.17 kg/m² as calculated from the following:    Height as of an earlier encounter on 6/9/21: 5' 11\" (1.803 m). Weight as of an earlier encounter on 6/9/21: 202 lb (91.6 kg). body mass index is unknown because there is no height or weight on file. Cardiac Clearance: None   Medical Clearance:None   Appointment for surgery Clearance scheduled for:None     Preoperative Testing: These are the current and completed labs:  CBC: No results found for: WBC, RBC, HGB, HCT, MCV, RDW, PLT  CMP:   Lab Results   Component Value Date     04/24/2021    K 4.7 04/24/2021     04/24/2021    CO2 27 04/24/2021    BUN 16 04/24/2021    CREATININE 0.79 04/24/2021    GFRAA >60 04/24/2021    LABGLOM >60 04/24/2021    GLUCOSE 95 04/24/2021    PROT 7.3 04/24/2021    CALCIUM 9.8 04/24/2021    BILITOT 0.41 04/24/2021    ALKPHOS 83 04/24/2021    AST 16 04/24/2021    ALT 16 04/24/2021     POC Tests: No results for input(s): POCGLU, POCNA, POCK, POCCL, POCBUN, POCHEMO, POCHCT in the last 72 hours.   Coags  No results found for: PROTIME, INR, APTT  HCG (If Applicable) No results found for: PREGTESTUR, PREGSERUM, HCG, HCGQUANT   ABGs No results found for: PHART, PO2ART, QDN4LIB, LNS5TED, BEART, Q9DRJRIL Type & Screen (If Applicable)  No results found for: Matt Ba    Additional ordered pre-operative testing:  []CBC    []ABG      [] BMP   []URINALYSIS   []CMP    []HCG   []COAGS PT/INR  []T&C  []LFTs   []TYPE AND SCREEN    [] EKG  [] Chest X-Ray  [] Other Radiology    [] Sent to Hospitalist None  [] Sent to Anesthesia for your review: CRNAs        Comments:Med hold sent to Dr. Rebolledo Crew   Requests: None    Signed:  Pj Topete LPN 4/5/3469 8:53 PM

## 2021-06-09 NOTE — PROGRESS NOTES
Abdominal Pain  This is a recurrent problem. The current episode started more than 1 year ago. Episode frequency: Once a week or less. Duration: 5 - 60 minutes. The pain is located in the epigastric region. The pain is severe. Quality: Get to \"hurtin' bad\". Sharp perhaps. He isn't sure. The abdominal pain does not radiate. Associated symptoms include diarrhea and nausea. Pertinent negatives include no anorexia, arthralgias, dysuria, fever, frequency, headaches, hematochezia, melena, myalgias, vomiting or weight loss. The pain is aggravated by movement (when he is active, when he works a lot). Relieved by: resting. His past medical history is significant for GERD. Gastroesophageal Reflux  He complains of abdominal pain, chest pain, dysphagia, heartburn ( occasionally, the pain is not heart burn) and nausea. He reports no choking, no coughing, no sore throat or no wheezing. Episode frequency: Gets heart burn 2 times a week. Has problems swallowing every time he eats. The heartburn duration is several minutes. The heartburn is located in the substernum. The heartburn is of moderate intensity. The symptoms are aggravated by certain foods. Associated symptoms include fatigue. Pertinent negatives include no melena or weight loss. Risk factors include smoking/tobacco exposure. Food doesn't seem to affect it. Happen twice last Saturday while he was mowing and doing other yard work, lasting 45 minutes and 20 minutes. Went away after her rested. Has been having difficulty breathing for the last 6 months or so. Rarely takes ibuprofen or acetaminophen. Omeprazole has not helped so far. Started in March.     Past Medical History:   Diagnosis Date    Hx of measles     Hx of mumps     Tobacco abuse      Past Surgical History:   Procedure Laterality Date    APPENDECTOMY  2003    CHOLECYSTECTOMY  2010    COLONOSCOPY  04/05/2012    1 polyp (hyperplastic), Dr. Jacki Romo, [facility]     Current General: Abdomen is flat. Bowel sounds are normal. There is no distension. Palpations: Abdomen is soft. There is no mass. Tenderness: There is no abdominal tenderness. There is no guarding or rebound. Hernia: No hernia is present. Musculoskeletal:      Cervical back: Normal range of motion. No rigidity or tenderness. Lymphadenopathy:      Head:      Right side of head: No submental, submandibular, tonsillar, preauricular, posterior auricular or occipital adenopathy. Left side of head: No submental, submandibular, tonsillar, preauricular or posterior auricular adenopathy. Cervical: No cervical adenopathy. Right cervical: No superficial or deep cervical adenopathy. Left cervical: No superficial, deep or posterior cervical adenopathy. Upper Body:      Right upper body: No supraclavicular, axillary or epitrochlear adenopathy. Left upper body: No supraclavicular, axillary or epitrochlear adenopathy. Lower Body: No right inguinal adenopathy. Skin:     General: Skin is warm and dry. Capillary Refill: Capillary refill takes less than 2 seconds. Nails: There is no clubbing. Neurological:      General: No focal deficit present. Mental Status: He is alert and oriented to person, place, and time. Chemistry profile was normal  CT of chest from April 1. Stable pulmonary nodules measuring 3-4 mm in the left lung as detailed   above.  Mild emphysema.  Old granulomatous disease. 2. Prior cholecystectomy.  Fatty liver. 3. Small hiatal hernia. EKG normal    IMP/PLAN  1) Sever Episodic Epigastric and Chest Pain  2) Ongoing shortness of breath  3) Progressive dysphagia  4) Heartburn separate form #1    Patient is a fairly active man for about a year now is been experiencing some worrisome symptoms. He has been having frequent episodes of shortness of breath these have persisted despite him quitting smoking November of last year.  He is also noticing

## 2021-06-09 NOTE — TELEPHONE ENCOUNTER
[facility]         Patient:Danny Talavera           :1960           Surgical/Procedure Planned: EGD    Date & Location: [facility]       Outpatient   Planned Length of OR: 30minutes    Sedation: general      Estimated Cardiac Risk for Non-Cardiac Surgery/Procedure     Low______ Moderate______ High______    Medication Instructions - Clarification needed by this date:     ASA 81 mg/325 mg Hold ___ Days    Resume medications:     Lovenox indicated: _____Yes _____NO    Provider:Dr. HOOKS      Signature of Provider Giving Orders for Medication holds:    _____________________________________________

## 2021-06-14 ENCOUNTER — HOSPITAL ENCOUNTER (OUTPATIENT)
Dept: NON INVASIVE DIAGNOSTICS | Age: 61
Discharge: HOME | End: 2021-06-14
Payer: COMMERCIAL

## 2021-06-14 ENCOUNTER — HOSPITAL ENCOUNTER (OUTPATIENT)
Dept: NUCLEAR MEDICINE | Age: 61
Discharge: HOME | End: 2021-06-16
Payer: COMMERCIAL

## 2021-06-14 DIAGNOSIS — I42.9 CARDIOMYOPATHY, UNSPECIFIED TYPE (HCC): ICD-10-CM

## 2021-06-14 DIAGNOSIS — R06.09 DYSPNEA ON EXERTION: ICD-10-CM

## 2021-06-14 LAB
LV EF: 70 %
LVEF MODALITY: NORMAL

## 2021-06-14 PROCEDURE — 93018 CV STRESS TEST I&R ONLY: CPT | Performed by: INTERNAL MEDICINE

## 2021-06-14 PROCEDURE — A9500 TC99M SESTAMIBI: HCPCS | Performed by: INTERNAL MEDICINE

## 2021-06-14 PROCEDURE — 3430000000 HC RX DIAGNOSTIC RADIOPHARMACEUTICAL: Performed by: INTERNAL MEDICINE

## 2021-06-14 PROCEDURE — 93306 TTE W/DOPPLER COMPLETE: CPT

## 2021-06-14 PROCEDURE — 93017 CV STRESS TEST TRACING ONLY: CPT

## 2021-06-14 PROCEDURE — 78452 HT MUSCLE IMAGE SPECT MULT: CPT

## 2021-06-14 RX ADMIN — TETRAKIS(2-METHOXYISOBUTYLISOCYANIDE)COPPER(I) TETRAFLUOROBORATE 10 MILLICURIE: 1 INJECTION, POWDER, LYOPHILIZED, FOR SOLUTION INTRAVENOUS at 08:15

## 2021-06-14 RX ADMIN — TETRAKIS(2-METHOXYISOBUTYLISOCYANIDE)COPPER(I) TETRAFLUOROBORATE 30 MILLICURIE: 1 INJECTION, POWDER, LYOPHILIZED, FOR SOLUTION INTRAVENOUS at 09:25

## 2021-06-14 NOTE — PROGRESS NOTES
Patient Name:  Shalini Wesley MRN:  [id number]   :  1960  Age:  62 y.o. Sex: male   Ordering Provider: Karoline Valdivia MD  Referring Provider: Roly Penn MD  Primary Care Provider:  Roly Penn MD     Indications: HOOKER and cardiomyopathy   Medications:     Current Outpatient Medications:     lisinopril (PRINIVIL;ZESTRIL) 10 MG tablet, Take 1 tablet by mouth daily, Disp: 30 tablet, Rfl: 5    omeprazole (PRILOSEC) 20 MG delayed release capsule, Take 1 capsule by mouth every morning (before breakfast), Disp: 30 capsule, Rfl: 0    aspirin 81 MG tablet, Take 81 mg by mouth daily, Disp: , Rfl:     ibuprofen (ADVIL;MOTRIN) 200 MG tablet, Take 200 mg by mouth every 6 hours as needed for Pain, Disp: , Rfl:   No current facility-administered medications for this encounter.     Facility-Administered Medications Ordered in Other Encounters:     technetium sestamibi (CARDIOLITE) injection 30 millicurie, 30 millicurie, Intravenous, ONCE PRN, Karoline Valdivia MD    technetium sestamibi (CARDIOLITE) injection 10 millicurie, 10 millicurie, Intravenous, ONCE PRN, Karoline Valdivia MD     ------------------------------------------------------------------------------------------------    Predicted Heart Rate:  Maximum:  161   85%:  137     Heart Rate Restin   Standin     Blood Pressure Restin/80  Standin/80     Exercise Protocol Used:  KAREN    Exercise Duration/Stage:  4:00 (II)     Test terminated due to: target heart rate reached and SOB  Imaging: Cardiolite     Maximum Heart Rate:  151 (93%) Max BP: 190/80  Max Workload: 5.80 METS     Chest Pain: No                   Onset:  N/A  Severity:  N/A     Electronically signed by Home Harmon RCP on 21 at 9:16 AM EDT    -------------------------------------------------------------------------------------------------  Baseline EKG :  Sinus bradycardia    Maximum Change:   Borderline 1-2 mm partially horizontal ST depression (II/III/AVF/V5/V6). Arrhythmias: none     Interpretation:   - Borderline 1-2 mm partially horizontal ST depression (II/III/AVF/V5/V6). - See Nuclear Portion    Supervising Physician:   Pamela Vegas [facility]  [facility]  [phone]

## 2021-06-14 NOTE — PROCEDURES
ischemia/infarction. 3. Normal left ventricular ejection fraction 61% with normal wall  motion.             Sonu Way DO    D: 06/14/2021 15:40:38       T: 06/14/2021 16:42:50     /RORY_EDIT  Job#: [id number]     Doc#: Unknown    CC:  Masha Diallo MD

## 2021-06-15 ENCOUNTER — TELEPHONE (OUTPATIENT)
Dept: CARDIOLOGY | Age: 61
End: 2021-06-15

## 2021-06-15 PROCEDURE — 93018 CV STRESS TEST I&R ONLY: CPT | Performed by: INTERNAL MEDICINE

## 2021-06-16 ENCOUNTER — OFFICE VISIT (OUTPATIENT)
Dept: CARDIOLOGY | Age: 61
End: 2021-06-16
Payer: COMMERCIAL

## 2021-06-16 ENCOUNTER — TELEPHONE (OUTPATIENT)
Dept: SURGERY | Age: 61
End: 2021-06-16

## 2021-06-16 VITALS
DIASTOLIC BLOOD PRESSURE: 79 MMHG | HEART RATE: 67 BPM | HEIGHT: 70 IN | BODY MASS INDEX: 28.92 KG/M2 | SYSTOLIC BLOOD PRESSURE: 124 MMHG | WEIGHT: 202 LBS

## 2021-06-16 DIAGNOSIS — R06.09 DYSPNEA ON EXERTION: Primary | ICD-10-CM

## 2021-06-16 DIAGNOSIS — I10 ESSENTIAL HYPERTENSION: ICD-10-CM

## 2021-06-16 DIAGNOSIS — R94.39 ABNORMAL STRESS ECG WITH TREADMILL: ICD-10-CM

## 2021-06-16 PROCEDURE — 99214 OFFICE O/P EST MOD 30 MIN: CPT | Performed by: INTERNAL MEDICINE

## 2021-06-16 NOTE — PROGRESS NOTES
Today's Date: 6/16/2021  Patient's Name: Mojgan Mendosa  Patient's age: 62 y.o., 1960    Subjective:  Mojgan Mendosa is being seen in clinic today regarding   Chief Complaint   Patient presents with    Hypertension     increased BP at last visit, needs approval for ASA hold- EGD Monday   pre op evaluation      he is here for pre op evaluation. He reports stable dyspnea on exertion. Feels better after initiation of BP medication. He denies any chest pain. TTE and stress test done and noted below. No PND, no syncope or pre-syncope, no orthopnea. Past Medical History:   has a past medical history of Hx of measles, Hx of mumps, and Tobacco abuse. Past Surgical History:   has a past surgical history that includes Appendectomy (2003); Cholecystectomy (2010); and Colonoscopy (04/05/2012). Home Medications:  Prior to Admission medications    Medication Sig Start Date End Date Taking? Authorizing Provider   lisinopril (PRINIVIL;ZESTRIL) 10 MG tablet Take 1 tablet by mouth daily 5/5/21  Yes Jarred Garcias MD   omeprazole (PRILOSEC) 20 MG delayed release capsule Take 1 capsule by mouth every morning (before breakfast) 4/15/21  Yes Judson Silva MD   aspirin 81 MG tablet Take 81 mg by mouth daily   Yes Historical Provider, MD   ibuprofen (ADVIL;MOTRIN) 200 MG tablet Take 200 mg by mouth every 6 hours as needed for Pain  Patient not taking: Reported on 6/16/2021    Historical Provider, MD       Allergies:  Sulfa antibiotics    Social History:   reports that he has quit smoking. His smoking use included cigarettes. He has a 60.00 pack-year smoking history. He quit smokeless tobacco use about 6 months ago. He reports current alcohol use. He reports that he does not use drugs.      Family History: family history includes Asthma in his father; Breast Cancer in his mother; Colon Cancer in his mother; Heart Disease in his brother; Heart Disease (age of onset: 37) in his brother; High Blood Pressure in his brother, brother, and sister; Other in his father. No h/o sudden cardiac death. No for premature CAD    REVIEW OF SYSTEMS:    · Constitutional: there has been no unanticipated weight loss. There's been No change in energy level, No change in activity level. · Eyes: No visual changes or diplopia. No scleral icterus. · ENT: No Headaches, hearing loss or vertigo. No mouth sores or sore throat. · Cardiovascular: see above  · Respiratory: see above  · Gastrointestinal: No abdominal pain, appetite loss, blood in stools. · Genitourinary: No dysuria, trouble voiding, or hematuria. · Musculoskeletal:  No gait disturbance, No weakness or joint complaints. · Integumentary: No rash or pruritis. · Neurological: No headache or diplopia. No tingling  · Psychiatric: No anxiety, or depression. · Endocrine: No temperature intolerance. · Hematologic/Lymphatic: No abnormal bruising or bleeding, blood clots or swollen lymph nodes. · Allergic/Immunologic: No nasal congestion or hives. PHYSICAL EXAM:      /79 (Site: Right Upper Arm, Position: Sitting, Cuff Size: Medium Adult)   Pulse 67   Ht 5' 10\" (1.778 m)   Wt 202 lb (91.6 kg)   BMI 28.98 kg/m²    Constitutional and General Appearance: alert, cooperative, no distress and appears stated age  HEENT: PERRL, no cervical lymphadenopathy. No masses palpable. Normal oral mucosa  Respiratory:  · Normal excursion and expansion without use of accessory muscles  · Resp Auscultation: Good respiratory effort. No for increased work of breathing. On auscultation: clear to auscultation bilaterally  Cardiovascular:  · Heart tones are crisp and normal. regular S1 and S2.  · Jugular venous pulsation Normal  · The carotid upstroke is normal in amplitude and contour without delay or bruit   Abdomen:   · soft  · Bowel sounds present  Extremities:  ·  No edema  Neurological:  · Alert and oriented.       Labs:     CBC: No results for input(s): WBC, HGB, HCT, PLT in the last 72 months.     Leah Aguero [facility]  [phone]

## 2021-06-16 NOTE — TELEPHONE ENCOUNTER
[address] [facility] [address]  Dept: [phone]  Loc: [phone]        Graciela Daniels MD           6/16/2021 (void after 30 days)           Re:  Mary Horvath             1960           Procedure/Surgery: EGD     Mary Rued is at low cardiac risk, but acceptable for the planned surgical procedure. He may proceed accordingly.        Special Instructions:     [x]? Patient is on ASA.   They will need to stop this medication 5 days prior to surgery and resume after.        Sincerely,       Graciela Daniels MD

## 2021-06-23 RX ORDER — NITROGLYCERIN 0.4 MG/1
0.4 TABLET SUBLINGUAL ONCE
Status: CANCELLED | OUTPATIENT
Start: 2021-06-23

## 2021-06-23 RX ORDER — SODIUM CHLORIDE 9 MG/ML
INJECTION, SOLUTION INTRAVENOUS CONTINUOUS
Status: CANCELLED | OUTPATIENT
Start: 2021-06-23

## 2021-06-25 ENCOUNTER — TELEPHONE (OUTPATIENT)
Dept: CARDIOLOGY | Age: 61
End: 2021-06-25

## 2021-06-25 NOTE — TELEPHONE ENCOUNTER
Pt has CTA coronary that was ordered and it was denied. Fariba Bridges from pre-cert called to ask if Dr Dejuan Reyes wants to do  a peer to peer or do something else instead. Please call to advise.      [phone]

## 2021-06-28 ENCOUNTER — HOSPITAL ENCOUNTER (OUTPATIENT)
Dept: CT IMAGING | Age: 61
Discharge: HOME | End: 2021-06-30
Payer: COMMERCIAL

## 2021-06-28 VITALS
DIASTOLIC BLOOD PRESSURE: 48 MMHG | SYSTOLIC BLOOD PRESSURE: 137 MMHG | HEART RATE: 57 BPM | RESPIRATION RATE: 16 BRPM | OXYGEN SATURATION: 100 %

## 2021-06-28 DIAGNOSIS — R06.09 DYSPNEA ON EXERTION: ICD-10-CM

## 2021-06-28 DIAGNOSIS — R94.39 ABNORMAL STRESS ECG WITH TREADMILL: ICD-10-CM

## 2021-06-28 PROCEDURE — 75574 CT ANGIO HRT W/3D IMAGE: CPT

## 2021-06-28 PROCEDURE — 6360000004 HC RX CONTRAST MEDICATION: Performed by: INTERNAL MEDICINE

## 2021-06-28 PROCEDURE — 2580000003 HC RX 258: Performed by: INTERNAL MEDICINE

## 2021-06-28 PROCEDURE — 6370000000 HC RX 637 (ALT 250 FOR IP): Performed by: INTERNAL MEDICINE

## 2021-06-28 RX ORDER — SODIUM CHLORIDE 9 MG/ML
INJECTION, SOLUTION INTRAVENOUS CONTINUOUS
Status: DISCONTINUED | OUTPATIENT
Start: 2021-06-28 | End: 2021-07-01 | Stop reason: HOSPADM

## 2021-06-28 RX ORDER — NITROGLYCERIN 0.4 MG/1
0.4 TABLET SUBLINGUAL ONCE
Status: COMPLETED | OUTPATIENT
Start: 2021-06-28 | End: 2021-06-28

## 2021-06-28 RX ORDER — METOPROLOL TARTRATE 50 MG/1
100 TABLET, FILM COATED ORAL ONCE
Status: COMPLETED | OUTPATIENT
Start: 2021-06-28 | End: 2021-06-28

## 2021-06-28 RX ORDER — SODIUM CHLORIDE 0.9 % (FLUSH) 0.9 %
10 SYRINGE (ML) INJECTION PRN
Status: DISCONTINUED | OUTPATIENT
Start: 2021-06-28 | End: 2021-07-01 | Stop reason: HOSPADM

## 2021-06-28 RX ORDER — 0.9 % SODIUM CHLORIDE 0.9 %
80 INTRAVENOUS SOLUTION INTRAVENOUS ONCE
Status: COMPLETED | OUTPATIENT
Start: 2021-06-28 | End: 2021-06-28

## 2021-06-28 RX ADMIN — SODIUM CHLORIDE, PRESERVATIVE FREE 10 ML: 5 INJECTION INTRAVENOUS at 12:17

## 2021-06-28 RX ADMIN — IOPAMIDOL 95 ML: 755 INJECTION, SOLUTION INTRAVENOUS at 12:17

## 2021-06-28 RX ADMIN — METOPROLOL TARTRATE 100 MG: 100 TABLET, FILM COATED ORAL at 10:49

## 2021-06-28 RX ADMIN — SODIUM CHLORIDE: 9 INJECTION, SOLUTION INTRAVENOUS at 10:50

## 2021-06-28 RX ADMIN — SODIUM CHLORIDE 80 ML: 9 INJECTION, SOLUTION INTRAVENOUS at 12:17

## 2021-06-28 RX ADMIN — NITROGLYCERIN 0.4 MG: 0.4 TABLET, ORALLY DISINTEGRATING SUBLINGUAL at 12:00

## 2021-06-28 NOTE — PROGRESS NOTES
Coronary CTA complete. Pt tolerated test well, IV taken out and pt discharged ambulatory. No distress noted.

## 2021-06-28 NOTE — TELEPHONE ENCOUNTER
Left message on voicemail for pt to call asap. Call had been transferred from pre service who had [place] on line with pt prior to receiving approval. Pt was at testing facility and left due to prior auth not complete yet and is driving home. Will see if pt can still have test today.

## 2021-06-28 NOTE — PROGRESS NOTES
Pt to IR, coronary CTA accepted by pts insurance. Test explained to pt. Questions answered. Pt hooked up to monitor, IV started. HR 67-77. 100mg po metoprolol given per protocol. HR down to 61-63. /93 to 114/53.

## 2021-06-28 NOTE — TELEPHONE ENCOUNTER
I called JOSE M back to ask for the additional information and they do not have a case number or ID for the peer to peer.  All that's needed is    Name    Member insurance ID # [id number]    Ph # to call is [phone]

## 2021-08-18 ENCOUNTER — TELEPHONE (OUTPATIENT)
Dept: FAMILY MEDICINE CLINIC | Age: 61
End: 2021-08-18

## 2021-08-18 NOTE — TELEPHONE ENCOUNTER
----- Message from Jo Bonilladl sent at 8/18/2021  3:11 PM EDT -----  Subject: Appointment Request    Reason for Call: Routine Joint Pain    QUESTIONS  Type of Appointment? Established Patient  Reason for appointment request? Available appointments did not meet   patient need  Additional Information for Provider? Joint pain off and on x's 1 month,   screened green, ok to see other pcp  ---------------------------------------------------------------------------  --------------  [address]  What is the best way for the office to contact you? OK to leave message on   voicemail  Preferred Call Back Phone Number? [phone]  ---------------------------------------------------------------------------  --------------  SCRIPT ANSWERS  Relationship to Patient? Other  Representative Name? Dinora Giles  Additional information verified (besides Name and Date of Birth)? Address  Did you have an injury or trauma within the past 3 days? No  Is your joint red or swollen? No  Are you having new onset numbness, tingling, or weakness with the pain? No  Did you have an injury or trauma within the past 14 days? No  Did your pain begin within the past week? No  Are you having fevers (100.4), chills, or sweats? No  (Is the patient requesting to be seen urgently for their symptoms?)? No  Have you recently (14 days) seen a provider for this issue? No  Have you been diagnosed with, awaiting test results for, or told that you   are suspected of having COVID-19 (Coronavirus)? (If patient has tested   negative or was tested as a requirement for work, school, or travel and   not based on symptoms, answer no)? No  Do you currently have flu-like symptoms including fever or chills, cough,   shortness of breath, difficulty breathing, or new loss of taste or smell? No  Have you had close contact with someone with COVID-19 in the last 14 days? No  (Service Expert  click yes below to proceed with [employer] As Usual   Scheduling)?  Yes

## 2021-08-19 ENCOUNTER — HOSPITAL ENCOUNTER (OUTPATIENT)
Dept: LAB | Age: 61
Discharge: HOME | End: 2021-08-19
Payer: COMMERCIAL

## 2021-08-19 ENCOUNTER — HOSPITAL ENCOUNTER (OUTPATIENT)
Dept: GENERAL RADIOLOGY | Age: 61
Discharge: HOME | End: 2021-08-21
Payer: COMMERCIAL

## 2021-08-19 ENCOUNTER — OFFICE VISIT (OUTPATIENT)
Dept: FAMILY MEDICINE CLINIC | Age: 61
End: 2021-08-19
Payer: COMMERCIAL

## 2021-08-19 VITALS
RESPIRATION RATE: 18 BRPM | WEIGHT: 197 LBS | HEIGHT: 70 IN | DIASTOLIC BLOOD PRESSURE: 86 MMHG | BODY MASS INDEX: 28.2 KG/M2 | HEART RATE: 78 BPM | SYSTOLIC BLOOD PRESSURE: 124 MMHG

## 2021-08-19 DIAGNOSIS — Z00.00 HEALTHCARE MAINTENANCE: ICD-10-CM

## 2021-08-19 DIAGNOSIS — M54.2 NECK PAIN: ICD-10-CM

## 2021-08-19 DIAGNOSIS — M25.50 ARTHRALGIA, UNSPECIFIED JOINT: ICD-10-CM

## 2021-08-19 DIAGNOSIS — M54.2 NECK PAIN: Primary | ICD-10-CM

## 2021-08-19 PROBLEM — E66.3 OVERWEIGHT (BMI 25.0-29.9): Status: ACTIVE | Noted: 2021-08-19

## 2021-08-19 LAB
HIGH SENSITIVE C-REACTIVE PROTEIN: 11.2 MG/L
RHEUMATOID FACTOR: <10 IU/ML
SEDIMENTATION RATE, ERYTHROCYTE: 10 MM (ref 0–20)
URIC ACID: 7.8 MG/DL (ref 3.4–7)

## 2021-08-19 PROCEDURE — 86431 RHEUMATOID FACTOR QUANT: CPT

## 2021-08-19 PROCEDURE — 85651 RBC SED RATE NONAUTOMATED: CPT

## 2021-08-19 PROCEDURE — 86141 C-REACTIVE PROTEIN HS: CPT

## 2021-08-19 PROCEDURE — 86200 CCP ANTIBODY: CPT

## 2021-08-19 PROCEDURE — 99214 OFFICE O/P EST MOD 30 MIN: CPT | Performed by: FAMILY MEDICINE

## 2021-08-19 PROCEDURE — 86812 HLA TYPING A B OR C: CPT

## 2021-08-19 PROCEDURE — 86038 ANTINUCLEAR ANTIBODIES: CPT

## 2021-08-19 PROCEDURE — 84550 ASSAY OF BLOOD/URIC ACID: CPT

## 2021-08-19 PROCEDURE — 36415 COLL VENOUS BLD VENIPUNCTURE: CPT

## 2021-08-19 PROCEDURE — 72040 X-RAY EXAM NECK SPINE 2-3 VW: CPT

## 2021-08-19 PROCEDURE — 86225 DNA ANTIBODY NATIVE: CPT

## 2021-08-19 ASSESSMENT — PATIENT HEALTH QUESTIONNAIRE - PHQ9
SUM OF ALL RESPONSES TO PHQ QUESTIONS 1-9: 0
1. LITTLE INTEREST OR PLEASURE IN DOING THINGS: 0
SUM OF ALL RESPONSES TO PHQ QUESTIONS 1-9: 0
2. FEELING DOWN, DEPRESSED OR HOPELESS: 0
SUM OF ALL RESPONSES TO PHQ9 QUESTIONS 1 & 2: 0
SUM OF ALL RESPONSES TO PHQ QUESTIONS 1-9: 0

## 2021-08-19 NOTE — PROGRESS NOTES
Joint pain. Chronic problem. Has a \"stiff neck\" most of the time. Back and knees hurt too. Whole body has aches and pains. Doesn't have arthritis that he knows of. Does not do PT, has not seen a chiropractor. Has tried tylenol PRN, does not help. No current interest in pneumo or shingles vaccin or tdap vaccine. No interest in HIV screening. Labs pended for one year follow up appointment in April 2022 per last office visit note.
included cigarettes. He has a 60.00 pack-year smoking history. He quit smokeless tobacco use about 8 months ago. Objective:    Vitals:    08/19/21 1441   BP: 124/86   Site: Right Upper Arm   Position: Sitting   Cuff Size: Large Adult   Pulse: 78   Resp: 18   Weight: 197 lb (89.4 kg)   Height: 5' 10\" (1.778 m)     Body mass index is 28.27 kg/m². Overweight male, healthy-appearing, alert, cooperative and in no acute distress. He has mildly decrease range of motion of the neck. Strength and sensation are symmetric in the upper extremities bilaterally. There are no swollen joints. There are no rashes or skin changes. Assessment and Plan:    1. Neck pain  2. Arthralgia, unspecified joint  Osteoarthritis vs. inflammatory arthritis vs. other  - XR CERVICAL SPINE (2-3 VIEWS); Future was ordered to be done today. He will be contacted when the radiologist's interpretation of his x-rays is available. Labs were also ordered to be done today:  - ANGELITA Screen with Reflex; Future  - CRP,High Sensitivity; Future  - Cyclic Citrul Peptide Antibody, IgG; Future  - Uric Acid; Future  - Sedimentation Rate; Future  - HLA-B27 Antigen; Future  - Rheumatoid Factor; Future    He will be contacted when the results are available.        (Please note that portions of this note were completed with a voice-recognition program. Efforts were made to edit the dictation but occasionally words are mis-transcribed.)

## 2021-08-21 LAB
ANTI DNA DOUBLE STRANDED: 3.8 IU/ML
ANTI-NUCLEAR ANTIBODY (ANA): NEGATIVE
CCP IGG ANTIBODIES: 1.6 U/ML (ref 0–7)
ENA ANTIBODIES SCREEN: 0.2 U/ML

## 2021-08-22 LAB — HLA B27: NEGATIVE

## 2021-08-24 ENCOUNTER — TELEPHONE (OUTPATIENT)
Dept: FAMILY MEDICINE CLINIC | Age: 61
End: 2021-08-24

## 2021-08-24 DIAGNOSIS — E79.0 ELEVATED URIC ACID IN BLOOD: Primary | ICD-10-CM

## 2021-08-24 RX ORDER — ALLOPURINOL 100 MG/1
100 TABLET ORAL DAILY
Qty: 30 TABLET | Refills: 1 | Status: SHIPPED | OUTPATIENT
Start: 2021-08-24 | End: 2021-09-24

## 2021-08-24 NOTE — TELEPHONE ENCOUNTER
Patient notified of results and voiced understanding.  He would like script sent to [address] [place] [address].

## 2021-08-24 NOTE — TELEPHONE ENCOUNTER
----- Message from Zee Calvert MD sent at 8/24/2021  1:00 PM EDT -----  Please advise Danny that the uric acid level is elevated, the CRP is also elevated. Allopurinol should decrease uric acid, and this may help joint pain. I recommend that he begin Allopurinol 100 mg daily. Please pend this to his pharmacy. Please order a uric acid level and basic metabolic panel to be done in one week.

## 2021-09-04 ENCOUNTER — HOSPITAL ENCOUNTER (OUTPATIENT)
Dept: LAB | Age: 61
Discharge: HOME | End: 2021-09-04
Payer: COMMERCIAL

## 2021-09-04 DIAGNOSIS — E79.0 ELEVATED URIC ACID IN BLOOD: ICD-10-CM

## 2021-09-04 LAB
ANION GAP SERPL CALCULATED.3IONS-SCNC: 11 MMOL/L (ref 9–17)
BUN BLDV-MCNC: 15 MG/DL (ref 6–20)
BUN/CREAT BLD: 17 (ref 9–20)
CALCIUM SERPL-MCNC: 9.6 MG/DL (ref 8.6–10.4)
CHLORIDE BLD-SCNC: 108 MMOL/L (ref 98–107)
CO2: 22 MMOL/L (ref 20–31)
CREAT SERPL-MCNC: 0.9 MG/DL (ref 0.7–1.2)
GFR AFRICAN AMERICAN: >60 ML/MIN
GFR NON-AFRICAN AMERICAN: >60 ML/MIN
GFR SERPL CREATININE-BSD FRML MDRD: ABNORMAL ML/MIN/{1.73_M2}
GFR SERPL CREATININE-BSD FRML MDRD: ABNORMAL ML/MIN/{1.73_M2}
GLUCOSE BLD-MCNC: 88 MG/DL (ref 70–99)
POTASSIUM SERPL-SCNC: 4.2 MMOL/L (ref 3.7–5.3)
SODIUM BLD-SCNC: 141 MMOL/L (ref 135–144)
URIC ACID: 5.7 MG/DL (ref 3.4–7)

## 2021-09-04 PROCEDURE — 36415 COLL VENOUS BLD VENIPUNCTURE: CPT

## 2021-09-04 PROCEDURE — 80048 BASIC METABOLIC PNL TOTAL CA: CPT

## 2021-09-04 PROCEDURE — 84550 ASSAY OF BLOOD/URIC ACID: CPT

## 2021-09-07 DIAGNOSIS — E79.0 ELEVATED URIC ACID IN BLOOD: Primary | ICD-10-CM

## 2021-09-24 ENCOUNTER — HOSPITAL ENCOUNTER (OUTPATIENT)
Dept: LAB | Age: 61
Discharge: HOME | End: 2021-09-24
Payer: COMMERCIAL

## 2021-09-24 DIAGNOSIS — R52 BODY ACHES: ICD-10-CM

## 2021-09-24 DIAGNOSIS — R52 BODY ACHES: Primary | ICD-10-CM

## 2021-09-24 DIAGNOSIS — I10 ESSENTIAL HYPERTENSION: ICD-10-CM

## 2021-09-24 LAB
C-REACTIVE PROTEIN: 3.5 MG/L (ref 0–5)
URIC ACID: 6.3 MG/DL (ref 3.4–7)

## 2021-09-24 PROCEDURE — 36415 COLL VENOUS BLD VENIPUNCTURE: CPT

## 2021-09-24 PROCEDURE — 86140 C-REACTIVE PROTEIN: CPT

## 2021-09-24 PROCEDURE — 84550 ASSAY OF BLOOD/URIC ACID: CPT

## 2021-09-24 RX ORDER — LISINOPRIL 10 MG/1
10 TABLET ORAL DAILY
Qty: 30 TABLET | Refills: 5 | Status: SHIPPED | OUTPATIENT
Start: 2021-09-24 | End: 2022-04-01 | Stop reason: SDUPTHER

## 2021-09-24 NOTE — TELEPHONE ENCOUNTER
Last Appt:  6/16/2021  Next Appt:   12/15/2021  Med verified in [facility]

## 2022-02-15 DIAGNOSIS — E79.0 ELEVATED URIC ACID IN BLOOD: ICD-10-CM

## 2022-02-16 RX ORDER — ALLOPURINOL 100 MG/1
TABLET ORAL
Qty: 30 TABLET | Refills: 2 | Status: SHIPPED | OUTPATIENT
Start: 2022-02-16 | End: 2022-04-01 | Stop reason: SDUPTHER

## 2022-02-16 NOTE — TELEPHONE ENCOUNTER
Danny called requesting a refill of the below medication which has been pended for you:     Requested Prescriptions     Pending Prescriptions Disp Refills    allopurinol (ZYLOPRIM) 100 MG tablet [Pharmacy Med Name: Allopurinol Oral Tablet 100 MG] 30 tablet 2     Sig: TAKE 1 TABLET BY MOUTH EVERY DAY       Last Appointment Date: 8/19/2021  Next Appointment Date: 4/1/2022    Allergies   Allergen Reactions    Sulfa Antibiotics

## 2022-03-30 ENCOUNTER — TELEPHONE (OUTPATIENT)
Dept: FAMILY MEDICINE CLINIC | Age: 62
End: 2022-03-30

## 2022-03-31 ENCOUNTER — HOSPITAL ENCOUNTER (OUTPATIENT)
Dept: LAB | Age: 62
Discharge: HOME | End: 2022-03-31
Payer: COMMERCIAL

## 2022-03-31 DIAGNOSIS — E79.0 ELEVATED URIC ACID IN BLOOD: ICD-10-CM

## 2022-03-31 LAB — URIC ACID: 5.2 MG/DL (ref 3.4–7)

## 2022-03-31 PROCEDURE — 84550 ASSAY OF BLOOD/URIC ACID: CPT

## 2022-03-31 PROCEDURE — 36415 COLL VENOUS BLD VENIPUNCTURE: CPT

## 2022-04-01 ENCOUNTER — OFFICE VISIT (OUTPATIENT)
Dept: FAMILY MEDICINE CLINIC | Age: 62
End: 2022-04-01
Payer: COMMERCIAL

## 2022-04-01 VITALS
TEMPERATURE: 97.4 F | WEIGHT: 192.8 LBS | HEIGHT: 70 IN | HEART RATE: 58 BPM | DIASTOLIC BLOOD PRESSURE: 80 MMHG | SYSTOLIC BLOOD PRESSURE: 132 MMHG | OXYGEN SATURATION: 96 % | BODY MASS INDEX: 27.6 KG/M2

## 2022-04-01 DIAGNOSIS — E79.0 ELEVATED URIC ACID IN BLOOD: ICD-10-CM

## 2022-04-01 DIAGNOSIS — M25.50 ARTHRALGIA, UNSPECIFIED JOINT: ICD-10-CM

## 2022-04-01 DIAGNOSIS — I10 ESSENTIAL HYPERTENSION: Primary | ICD-10-CM

## 2022-04-01 DIAGNOSIS — Z12.11 COLON CANCER SCREENING: ICD-10-CM

## 2022-04-01 DIAGNOSIS — Z87.891 PERSONAL HISTORY OF TOBACCO USE: ICD-10-CM

## 2022-04-01 PROCEDURE — 99213 OFFICE O/P EST LOW 20 MIN: CPT | Performed by: FAMILY MEDICINE

## 2022-04-01 PROCEDURE — G0296 VISIT TO DETERM LDCT ELIG: HCPCS | Performed by: FAMILY MEDICINE

## 2022-04-01 RX ORDER — LISINOPRIL 10 MG/1
10 TABLET ORAL DAILY
Qty: 90 TABLET | Refills: 1 | Status: SHIPPED | OUTPATIENT
Start: 2022-04-01

## 2022-04-01 RX ORDER — ALLOPURINOL 100 MG/1
TABLET ORAL
Qty: 90 TABLET | Refills: 1 | Status: SHIPPED | OUTPATIENT
Start: 2022-04-01

## 2022-04-01 NOTE — PROGRESS NOTES
[facility] [address], [facility] [address]                        Telephone [phone]             Fax [phone]       Nathalie Villagran  :  1960  Age:  62 y.o. MRN:  [id number]  Date of visit:  2022       Assessment and Plan:    1. Essential hypertension  His blood pressure is adequately-controlled today. (BP: 132/80)   He was advised to continue current medications. Lisinopril was refilled:   - lisinopril (PRINIVIL;ZESTRIL) 10 MG tablet; Take 1 tablet by mouth daily  Dispense: 90 tablet; Refill: 1    Labs were ordered to be done when he returns fasting:  - Comprehensive Metabolic Panel; Future  - Lipid Panel; Future    Printed information regarding Elevated Blood Pressure was provided to the patient with the after visit summary. 2. Elevated uric acid in blood  His uric acid level was within normal limits (5.2 mg/dL) on his recent lab work. Allopurinol was refilled:    - allopurinol (ZYLOPRIM) 100 MG tablet; TAKE 1 TABLET BY MOUTH EVERY DAY  Dispense: 90 tablet; Refill: 1    3. Arthralgia  Probable osteoarthritis. Tylenol and/or Ibuprofen prn were recommended. 4. Personal history of tobacco use  Low Dose CT (LDCT) Lung Screening criteria met:     Age 50-77(Medicare) or 50-80 (USPSTF)   Pack year smoking >20   Still smoking or less than 15 year since quit   No sign or symptoms of lung cancer   > 11 months since last LDCT     Risks and benefits of lung cancer screening with LDCT scans discussed:    Significance of positive screen - False-positive LDCT results often occur. 95% of all positive results do not lead to a diagnosis of cancer. Usually further imaging can resolve most false-positive results; however, some patients may require invasive procedures.     Over diagnosis risk - 10% to 12% of screen-detected lung cancer cases are over diagnosedthat is, the cancer would not have been detected in the patient's lifetime without the screening. Need for follow up screens annually to continue lung cancer screening effectiveness     Risks associated with radiation from annual LDCT- Radiation exposure is about the same as for a mammogram, which is about 1/3 of the annual background radiation exposure from everyday life. Starting screening at age 54 is not likely to increase cancer risk from radiation exposure. Patients with comorbidities resulting in life expectancy of < 10 years, or that would preclude treatment of an abnormality identified on CT, should not be screened due to lack of benefit. To obtain maximal benefit from this screening, smoking cessation and long-term abstinence from smoking is critical    - DE VISIT TO DISCUSS LUNG CA SCREEN W LDCT  - CT Lung Screen (Annual); Future    4. Routine health maintenance  Health maintenance was reviewed with the patient. He has received three doses of the Pfizer Covid-19 vaccine. He was advised that a fourth dose is recommended for people over age 48 if it has been more than four months since the previous dose. Colonoscopy was recommended. A referral was made to general surgery. HIV screening was recommended and declined. Shingrix, Tdap, and Pneumonia vaccination were recommended and declined. Follow up instructions were given to the patient:  Return in about 6 months (around 10/1/2022) for hypertension. Subjective:    Nathalie Villagran is a 62 y.o. male who presents to [place] [address] today (4/1/2022) for follow up/evaluation of:  6 Month Follow-Up, Joint Pain (Patient reports continue chronic intermittent joint/muscle pain. Is taking Allopurinol as ordered. Reports slight improvement with taking the Allopurinol, but is still having pain at times.), and Hypertension      Overall, he states that he has been feeling well. He is tolerating his medications well.    He reports occasional episodes of joint pain and joint stiffness. He quit smoking in November 2020. He has received three doses of the Pfizer Covid-19 vaccine. Immunization history was reviewed:  Immunization History   Administered Date(s) Administered    COVID-19, Pfizer Purple top, DILUTE for use, 12+ yrs, 30mcg/0.3mL dose 03/20/2021, 04/10/2021, 12/31/2021          He has the following problem list:  Patient Active Problem List   Diagnosis    History of tobacco use    Family history of cardiomyopathy    Dyspnea on exertion    Overweight (BMI 25.0-29. 9)        Current medications are:  Outpatient Medications Marked as Taking for the 4/1/22 encounter (Office Visit) with Jordin Lucas MD   Medication Sig Dispense Refill    allopurinol (ZYLOPRIM) 100 MG tablet TAKE 1 TABLET BY MOUTH EVERY DAY 30 tablet 2    lisinopril (PRINIVIL;ZESTRIL) 10 MG tablet Take 1 tablet by mouth daily 30 tablet 5    omeprazole (PRILOSEC) 20 MG delayed release capsule Take 1 capsule by mouth every morning (before breakfast) 30 capsule 0    aspirin 81 MG tablet Take 81 mg by mouth daily         He is allergic to sulfa antibiotics. He is not currently a smoker. He  reports that he has quit smoking. His smoking use included cigarettes. He has a 60.00 pack-year smoking history. He quit smokeless tobacco use about 16 months ago. Objective:    Vitals:    04/01/22 1259   BP: 132/80   Site: Right Upper Arm   Position: Sitting   Cuff Size: Large Adult   Pulse: 58   Temp: 97.4 °F (36.3 °C)   TempSrc: Temporal   SpO2: 96%   Weight: 192 lb 12.8 oz (87.5 kg)   Height: 5' 10\" (1.778 m)     Body mass index is 27.66 kg/m². Overweight male, healthy-appearing, alert, cooperative and in no acute distress. Neck supple. No adenopathy. Thyroid symmetric, normal size. Chest:  Normal expansion. Clear to auscultation. No rales, rhonchi, or wheezing. Heart sounds are normal.  Regular rate and rhythm without murmur, gallop or rub. Lower extremities have no edema.   There is no erythema or joint swelling.       Results of labs done 3/31/2022 were reviewed with the patient:   Hospital Outpatient Visit on 03/31/2022   Component Date Value Ref Range Status    Uric Acid 03/31/2022 5.2  3.4 - 7.0 mg/dL Final           (Please note that portions of this note were completed with a voice-recognition program. Efforts were made to edit the dictation but occasionally words are mis-transcribed.)

## 2022-04-01 NOTE — PATIENT INSTRUCTIONS
What is lung cancer screening? Lung cancer screening is a way in which doctors check the lungs for early signs of cancer in people who have no symptoms of lung cancer. A low-dose CT scan uses much less radiation than a normal CT scan and shows a more detailed image of the lungs than a standard X-ray. The goal of lung cancer screening is to find cancer early, before it has a chance to grow, spread, or cause problems. One large study found that smokers who were screened with low-dose CT scans were less likely to die of lung cancer than those who were screened with standard X-ray. Below is a summary of the things you need to know regarding screening for lung cancer with low-dose computed tomography (LDCT). This is a screening program that involves routine annual screening with LDCT studies of the lung. The LDCTs are done using low-dose radiation that is not thought to increase your cancer risk. If you have other serious medical conditions (other cancers, congestive heart failure) that limit your life expectancy to less than 10 years, you should not undergo lung cancer screening with LDCT. The chance is 20%-60% that the LDCT result will show abnormalities. This would require additional testing which could include repeat imaging or even invasive procedures. Most (about 95%) of \"abnormal\" LDCT results are false in the sense that no lung cancer is ultimately found. Additionally, some (about 10%) of the cancers found would not affect your life expectancy, even if undetected and untreated. If you are still smoking, the single most important thing that you can do to reduce your risk of dying of lung cancer is to quit. For this screening to be covered by Medicare and most other insurers, strict criteria must be met. If you do not meet these criteria, but still wish to undergo LDCT testing, you will be required to sign a waiver indicating your willingness to pay for the scan.       Patient Education Elevated Blood Pressure: Care Instructions  Your Care Instructions    Blood pressure is a measure of how hard the blood pushes against the walls of your arteries. It's normal for blood pressure to go up and down throughout the day. But if it stays up over time, you have high blood pressure. Two numbers tell you your blood pressure. The first number is the systolic pressure. It shows how hard the blood pushes when your heart is pumping. The second number is the diastolic pressure. It shows how hard the blood pushes between heartbeats, when your heart is relaxed and filling with blood. An ideal blood pressure in adults is less than 120/80 (say \"120 over 80\"). High blood pressure is 140/90 or higher. You have high blood pressure if your top number is 140 or higher or your bottom number is 90 or higher, or both. The main test for high blood pressure is simple, fast, and painless. To diagnose high blood pressure, your doctor will test your blood pressure at different times. After testing your blood pressure, your doctor may ask you to test it again when you are home. If you are diagnosed with high blood pressure, you can work with your doctor to make a long-term plan to manage it. Follow-up care is a key part of your treatment and safety. Be sure to make and go to all appointments, and call your doctor if you are having problems. It's also a good idea to know your test results and keep a list of the medicines you take. How can you care for yourself at home? · Do not smoke. Smoking increases your risk for heart attack and stroke. If you need help quitting, talk to your doctor about stop-smoking programs and medicines. These can increase your chances of quitting for good. · Stay at a healthy weight. · Try to limit how much sodium you eat to less than 2,300 milligrams (mg) a day. Your doctor may ask you to try to eat less than 1,500 mg a day. · Be physically active.  Get at least 30 minutes of exercise on most days of the week. Walking is a good choice. You also may want to do other activities, such as running, swimming, cycling, or playing tennis or team sports. · Avoid or limit alcohol. Talk to your doctor about whether you can drink any alcohol. · Eat plenty of fruits, vegetables, and low-fat dairy products. Eat less saturated and total fats. · Learn how to check your blood pressure at home. When should you call for help? Call your doctor now or seek immediate medical care if:    · Your blood pressure is much higher than normal (such as 180/110 or higher).     · You think high blood pressure is causing symptoms such as:  ¨ Severe headache. ¨ Blurry vision.    Watch closely for changes in your health, and be sure to contact your doctor if:    · You do not get better as expected. Where can you learn more? Go to [email or web address] org and sign in to your [employer] account. Enter P769 in the [employer] box to learn more about \"Elevated Blood Pressure: Care Instructions. \"     If you do not have an account, please click on the \"Sign Up Now\" link. Current as of: May 10, 2017  Content Version: 11.6  © 2425-8969 [employer]. Care instructions adapted under license by [facility] ([facility]). If you have questions about a medical condition or this instruction, always ask your healthcare professional. [address] any warranty or liability for your use of this information.

## 2022-04-04 ENCOUNTER — TELEPHONE (OUTPATIENT)
Dept: ONCOLOGY | Age: 62
End: 2022-04-04

## 2022-04-06 ENCOUNTER — TELEPHONE (OUTPATIENT)
Dept: SURGERY | Age: 62
End: 2022-04-06

## 2022-04-06 NOTE — TELEPHONE ENCOUNTER
Mailed colonoscopy paperwork for repeat colonoscopy.   Patient states he wants procedure done in [place]

## 2022-04-11 ENCOUNTER — TELEPHONE (OUTPATIENT)
Dept: FAMILY MEDICINE CLINIC | Age: 62
End: 2022-04-11

## 2022-04-11 ENCOUNTER — HOSPITAL ENCOUNTER (OUTPATIENT)
Dept: CT IMAGING | Age: 62
Discharge: HOME | End: 2022-04-13
Payer: COMMERCIAL

## 2022-04-11 DIAGNOSIS — R91.1 LUNG NODULE: Primary | ICD-10-CM

## 2022-04-11 DIAGNOSIS — Z87.891 PERSONAL HISTORY OF TOBACCO USE: ICD-10-CM

## 2022-04-11 PROCEDURE — 71271 CT THORAX LUNG CANCER SCR C-: CPT

## 2022-04-11 NOTE — TELEPHONE ENCOUNTER
----- Message from Rody Rodriguez MD sent at 4/11/2022  3:14 PM EDT -----  Please advise the patient that there is an enlarged lymph node on the CT scan that has increased in size in the past year. I recommend consultation with pulmonology.   Please order a referral.

## 2022-04-14 ENCOUNTER — TELEPHONE (OUTPATIENT)
Dept: PRIMARY CARE CLINIC | Age: 62
End: 2022-04-14

## 2022-04-14 NOTE — TELEPHONE ENCOUNTER
Patient aware of fasting labs and plans to get them done this Saturday. Lab hours reviewed with patient.

## 2022-04-16 ENCOUNTER — HOSPITAL ENCOUNTER (OUTPATIENT)
Dept: LAB | Age: 62
Discharge: HOME | End: 2022-04-16
Payer: COMMERCIAL

## 2022-04-16 DIAGNOSIS — I10 ESSENTIAL HYPERTENSION: ICD-10-CM

## 2022-04-16 LAB
ALBUMIN SERPL-MCNC: 4.2 G/DL (ref 3.5–5.2)
ALBUMIN/GLOBULIN RATIO: 1.6 (ref 1–2.5)
ALP BLD-CCNC: 86 U/L (ref 40–129)
ALT SERPL-CCNC: 12 U/L (ref 5–41)
ANION GAP SERPL CALCULATED.3IONS-SCNC: 8 MMOL/L (ref 9–17)
AST SERPL-CCNC: 13 U/L
BILIRUB SERPL-MCNC: 0.3 MG/DL (ref 0.3–1.2)
BUN BLDV-MCNC: 11 MG/DL (ref 8–23)
BUN/CREAT BLD: 12 (ref 9–20)
CALCIUM SERPL-MCNC: 9.8 MG/DL (ref 8.6–10.4)
CHLORIDE BLD-SCNC: 109 MMOL/L (ref 98–107)
CHOLESTEROL/HDL RATIO: 3.6
CHOLESTEROL: 149 MG/DL
CO2: 25 MMOL/L (ref 20–31)
CREAT SERPL-MCNC: 0.89 MG/DL (ref 0.7–1.2)
GFR AFRICAN AMERICAN: >60 ML/MIN
GFR NON-AFRICAN AMERICAN: >60 ML/MIN
GFR SERPL CREATININE-BSD FRML MDRD: ABNORMAL ML/MIN/{1.73_M2}
GLUCOSE BLD-MCNC: 101 MG/DL (ref 70–99)
HDLC SERPL-MCNC: 41 MG/DL
LDL CHOLESTEROL: 82 MG/DL (ref 0–130)
POTASSIUM SERPL-SCNC: 4.6 MMOL/L (ref 3.7–5.3)
SODIUM BLD-SCNC: 142 MMOL/L (ref 135–144)
TOTAL PROTEIN: 6.9 G/DL (ref 6.4–8.3)
TRIGL SERPL-MCNC: 131 MG/DL

## 2022-04-16 PROCEDURE — 36415 COLL VENOUS BLD VENIPUNCTURE: CPT

## 2022-04-16 PROCEDURE — 80053 COMPREHEN METABOLIC PANEL: CPT

## 2022-04-16 PROCEDURE — 80061 LIPID PANEL: CPT

## 2022-04-19 ENCOUNTER — OFFICE VISIT (OUTPATIENT)
Dept: PULMONOLOGY | Age: 62
End: 2022-04-19
Payer: COMMERCIAL

## 2022-04-19 VITALS
TEMPERATURE: 97.6 F | HEIGHT: 70 IN | SYSTOLIC BLOOD PRESSURE: 132 MMHG | BODY MASS INDEX: 27.49 KG/M2 | DIASTOLIC BLOOD PRESSURE: 80 MMHG | OXYGEN SATURATION: 97 % | WEIGHT: 192 LBS | HEART RATE: 66 BPM

## 2022-04-19 DIAGNOSIS — J43.2 CENTRILOBULAR EMPHYSEMA (HCC): ICD-10-CM

## 2022-04-19 DIAGNOSIS — R59.0 MEDIASTINAL LYMPHADENOPATHY: Primary | ICD-10-CM

## 2022-04-19 PROCEDURE — 99204 OFFICE O/P NEW MOD 45 MIN: CPT | Performed by: INTERNAL MEDICINE

## 2022-04-19 NOTE — PROGRESS NOTES
REASON FOR THE CONSULTATION:  Mediastinal LAP   HISTORY OF PRESENT ILLNESS:    Corrie Lozano is a 62y.o. year old male     Patient has chronic grade 2 dyspnea , which has been stable with one block walking   ,  Complains of Yes Cough , Nosputum production   , No  hemoptysis , No Associated with wheezing . No home oxygen  . Immunization   Immunization History   Administered Date(s) Administered    COVID-19, [employer] top, DILUTE for use, 12+ yrs, 30mcg/0.3mL dose 03/20/2021, 04/10/2021, 12/31/2021      PAST MEDICAL HISTORY:       Diagnosis Date    Hx of measles     Hx of mumps     Tobacco abuse          Family History:       Problem Relation Age of Onset    Colon Cancer Mother     Breast Cancer Mother     Asthma Father     Other Father         lung disease    High Blood Pressure Sister     High Blood Pressure Brother     High Blood Pressure Brother     Heart Disease Brother         hypertrophic cardiomyopathy    Heart Disease Brother 37        MI       SURGICAL HISTORY:   Past Surgical History:   Procedure Laterality Date    APPENDECTOMY  2003    CHOLECYSTECTOMY  2010    COLONOSCOPY  04/05/2012    1 polyp (hyperplastic), Dr. Yasmin Melvin, [place]:      There  No history of TB or TB exposure. There  Yes asbestos ,Nosilica dust exposure. The patient reports  No coal mine, [place], [place], [facility] exposure. History of travel outside the country No  There  is use of   marijuana No   VapingNo  IV heroinNo  Crack cocaineNo  There  Nohot tub exposure. 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 His smoking use included cigarettes. He has a 60.00 pack-year smoking history. He quit smokeless tobacco use about 16 months ago.   ETOH:   reports current alcohol use. ALLERGIES:      Allergies   Allergen Reactions    Sulfa Antibiotics          Home Meds:   Prior to Admission medications    Medication Sig Start Date End Date Taking?  Authorizing Provider   allopurinol (ZYLOPRIM) 100 MG tablet TAKE 1 TABLET BY MOUTH EVERY DAY 4/1/22   Merlin Kil, MD   lisinopril (PRINIVIL;ZESTRIL) 10 MG tablet Take 1 tablet by mouth daily 4/1/22   Merlin Kil, MD   omeprazole (PRILOSEC) 20 MG delayed release capsule Take 1 capsule by mouth every morning (before breakfast) 4/15/21   Merlin Kil, MD   aspirin 81 MG tablet Take 81 mg by mouth daily    Historical Provider, MD              REVIEW OF SYSTEMS:    CONSTITUTIONAL:  negative for  fevers, chills, sweats, fatigue, malaise, anorexia and weight loss  EYES:  negative for  double vision, blurred vision, dry eyes, eye discharge and redness  HEENT:  negative for  hearing loss, tinnitus, ear drainage, earaches and nasal congestion  RESPIRATORY:  See hpi  CARDIOVASCULAR:  negative for  chest pain,, palpitations, orthopnea, PND  GASTROINTESTINAL:  negative for nausea, vomiting, change in bowel habits, diarrhea, constipation, abdominal pain, pruritus, abdominal mass and abdominal distention  GENITOURINARY:  negative for frequency, dysuria, nocturia, urinary incontinence and hesitancy  INTEGUMENT  negative for rash, skin lesion(s), dryness, skin color change, changes in lesion, pruritus and changes in hair  HEMATOLOGIC/LYMPHATIC:  negative for easy bruising, bleeding, lymphadenopathy, petechiae and swelling/edema  ALLERGIC/IMMUNOLOGIC:  negative for recurrent infections, urticaria and drug reactions  ENDOCRINE:  negative for heat intolerance, cold intolerance, tremor, weight changes and change in bowel habits  MUSCULOSKELETAL:  negative for  myalgias, arthralgias, pain, joint swelling, stiff joints and decreased range of motion  NEUROLOGICAL:  negative for headaches, dizziness, seizures, memory problems, speech problems, visual disturbance and coordination problems  BEHAVIOR/PSYCH:  negative for poor appetite, increased appetite, decreased sleep, increased sleep, decreased energy level, increased energy level and poor concentration  Skin no rash no dermatitis  Vitals:  /80   Pulse 66   Temp 97.6 °F (36.4 °C)   Ht 5' 10\" (1.778 m)   Wt 192 lb (87.1 kg)   SpO2 97%   BMI 27.55 kg/m²     PHYSICAL EXAM:  General Appearance:    Alert, cooperative, no distress, appears stated age   Head:    Normocephalic, without obvious abnormality, atraumatic      Eyes:    PERRL, conjunctiva/corneas clear, EOM's intact   Ears:    Normal  external ear canals, both ears   Nose:   Nares normal, septum midline, mucosa normal, no drainage        or sinus tenderness   Throat:   Lips, mucosa, and tongue normal; teeth and gums normal   Neck:   Supple, symmetrical, trachea midline, no adenopathy;     thyroid:  no enlargement/tenderness/nodules; no carotid    bruit , noJVD   Back:     Symmetric, no curvature, ROM normal, no CVA tenderness   Lungs:     Ventilating all lobes without any rales, rhonchi, wheezes or dullness. No bronchial breath sounds. Chest expansion equal bilaterally    Chest Wall:    No tenderness or deformity      Heart:    Regular rate and rhythm, S1 and S2 normal, no murmur, rub        or gallop no rvh                           Abdomen:                                                 Pulses:                              Skin:                  Lymph nodes:                    Neurologic:                  Soft, non-tender, bowel sounds active all four quadrants,     no masses, no organomegaly         2+ and symmetric all extremities     Skin color, texture, turgor normal, no rashes or lesions       Cervical, supraclavicular not enlarged or matted or tender      CNII-XII intact, normal strength 5/5 .   Sensation grossly normal  and reflexes normal 2+  throughout     Clubbing No  Lower ext edema No  Upper ext edema No Musculoskeletal no synovitis. No joint swelling or tenderness        CBC: No results for input(s): WBC, HGB, PLT in the last 72 hours. BMP:  No results for input(s): NA, K, CL, CO2, BUN, CREATININE, GLUCOSE in the last 72 hours. Hepatic: No results for input(s): AST, ALT, ALB, BILITOT, ALKPHOS in the last 72 hours. Amylase: No results found for: AMYLASE  Lipase: No results found for: LIPASE  Troponin: No results for input(s): TROPONINI in the last 72 hours. BNP: No results for input(s): BNP in the last 72 hours. Lipids: No results for input(s): CHOL, HDL in the last 72 hours. Invalid input(s): LDLCALCU  ABGs: No results found for: PHART, PO2ART, [id number]  INR: No results for input(s): INR in the last 72 hours. Thyroid: No results found for: T4, TSH  Urinalysis: No results for input(s): BACTERIA, BLOODU, CLARITYU, COLORU, PHUR, PROTEINU, RBCUA, SPECGRAV, BILIRUBINUR, NITRU, WBCUA, LEUKOCYTESUR, GLUCOSEU in the last 72 hours. No results found. CT Lung Screen (Annual)    Result Date: 2022  EXAMINATION: LOW DOSE SCREENING CT OF THE CHEST WITHOUT CONTRAST 2022 7:57 am TECHNIQUE: Low dose lung cancer screening CT of the chest was performed without the administration of intravenous contrast.  Multiplanar reformatted images are provided for review. Dose modulation, iterative reconstruction, and/or weight based adjustment of the mA/kV was utilized to reduce the radiation dose to as low as reasonably achievable. Field-of-view: 32 cm Dose Length Product: 84.20 mGy CTDlvol: 2.30 mGy COMPARISON: 2021 HISTORY: Screening. Patient Age: 63 y/o Number of pack years of smokin pack years If no longer smoking, number of years since cessation:  1.5 years Other symptoms:  None. Additional history: ORDERING SYSTEM PROVIDED HISTORY: Personal history of tobacco use TECHNOLOGIST PROVIDED HISTORY: Age: Patient is 62 y.o.  Smoking History: Social History Tobacco Use Smoking status: Former Smoker Packs/day: 2.00 Years: 30.00 Pack years: 61 Types: Cigarettes Smokeless tobacco: Former User Quit date: 2020 Vaping Use Vaping Use: Never used Alcohol use: Yes Comment: 2 beers a month. Drug use: No Pack years: 61 Date of last lung cancer screenin2021 Is there documentation of shared decision making? ->Yes Is this a low dose CT or a routine CT?->Low Dose CT Is this the first (baseline) CT or an annual exam?->Annual Does the patient show any signs or symptoms of lung cancer? ->No Smoking Status? ->Former Smoker Date quit smoking? (must be within 15 years)->20 Smoking packs per day?->2 Years smoking?->30 Reason for Exam: Lung screening FINDINGS: Mediastinum:  Atherosclerotic calcification of the aorta and branch vasculature. Calcified mediastinal, subcarinal, and left infrahilar as well as paraesophageal lymph nodes. Enlarged right paratracheal lymph node measuring up to 3 cm, similar to 2021, but enlarged from 2021. No axillary lymphadenopathy. No pericardial or pleural effusions. No periaortic or mediastinal hemorrhage. Lungs/Pleura:  Trachea and proximal central airways appear patent. Mild dependent atelectasis and respiratory motion. Tree-in-bud opacities in the left lower lobe. Old granulomatous disease. Scattered calcified granulomata. Mild emphysema. Mild biapical pleuroparenchymal scarring. 3 mm stable subpleural pulmonary nodule in the left lower lobe on image 68, series 4, unchanged from 2021. Stable 4 mm subpleural left lower lobe pulmonary nodule on image 79, series 4. Calcified granuloma in the subpleural left lower lobe on image 64, series 4, stable. Upper Abdomen:  Prior cholecystectomy. Small hiatal hernia. Scattered calcifications throughout the spleen consistent with old granulomatous disease. Fatty liver. 1.5 cm stable left upper pole renal cyst on image 128, series 3. Soft Tissues/Bones: Mild multilevel degenerative changes in the spine.  Multilevel Schmorl's node deformities. 1. Tree-in-bud opacities in the left lower lobe likely representing mild infiltrate or sequela of aspiration. Follow-up is recommended to document resolution. 2. Old granulomatous disease. Mild emphysema. Mild biapical pleuroparenchymal scarring. Mild dependent atelectasis and respiratory motion. Superimposed stable 3 mm and 4 mm left lower lobe subpleural pulmonary nodules. 3. Prior cholecystectomy. Fatty liver. 4. Small hiatal hernia. 5. 1.5 cm stable left upper pole renal cyst. 6. Enlarged right paratracheal lymph node measuring up to 3 cm, similar to 06/28/2021, but enlarged from 04/09/2021. Cause of this is indeterminate, but lymphoma or metastatic disease is difficult to exclude on the basis of this study. Close CT surveillance is recommended. The findings were sent to the Radiology Results [address] at 8:40 am on 4/11/2022to be communicated to a licensed caregiver. LUNG RADS: Per ACR Lung-RADS Version 1.1 Category 2, Benign appearance or behavior. Management:  Continue annual lung screening with LDCT in 12 months. Findings:  Modifier-may add on to category 0-4 coding. Management:  As appropriate to specific finding. RECOMMENDATIONS: If you would like to register your patient with the [facility] Nodule/Lung Cancer Screening Program, please contact the Nurse Navigator at [phone]). IMPRESSION:     Diagnosis Orders   1. Mediastinal lymphadenopathy  CT CHEST W CONTRAST   2. Centrilobular emphysema ([address] mild  Full PFT Study With Bronchodilator        :                PLAN:       will obtain ct chest w contrast for better evaluation of mediastinal LN particularly right paratracheal .   Based on CT may need  pet ct and then EBUS -TBNA . PFT   Imaging reviewed with patient         Requested Prescriptions      No prescriptions requested or ordered in this encounter       There are no discontinued medications.     Danny received counseling on the following healthy behaviors: nutrition, exercise and medication adherence    Patient given educational materials : see patient instruction       Discussed use, benefit, and side effects of prescribed medications. Barriers to medication compliance addressed. All patient questions answered. Pt voiced understanding. I hope this updates you on my evaluation and clinical thinking. Thank you for allowing me to participate in his care. Sincerely,      Electronically signed by Kassy Seals MD on   4/19/22 at 10:05 AM EDT       Please note that this chart was generated using voice recognition Dragon dictation software. Although every effort was made to ensure the accuracy of this automated transcription, some errors in transcription may have occurred.

## 2022-04-20 ENCOUNTER — TELEPHONE (OUTPATIENT)
Dept: FAMILY MEDICINE CLINIC | Age: 62
End: 2022-04-20

## 2022-04-20 DIAGNOSIS — R73.01 ELEVATED FASTING GLUCOSE: ICD-10-CM

## 2022-04-20 DIAGNOSIS — I10 ESSENTIAL HYPERTENSION: Primary | ICD-10-CM

## 2022-04-20 NOTE — TELEPHONE ENCOUNTER
----- Message from Rd Grier MD sent at 4/19/2022  8:53 PM EDT -----  Please advise Danny that the glucose is mildly elevated. The rest of the labs have no significant abnormalities. Please order a comprehensive panel, lipid panel, and HgbA1c to be done before his next office visit in 6 months. Thank you.

## 2022-04-26 ENCOUNTER — HOSPITAL ENCOUNTER (OUTPATIENT)
Dept: CT IMAGING | Age: 62
Discharge: HOME | End: 2022-04-28
Payer: COMMERCIAL

## 2022-04-26 DIAGNOSIS — R59.0 MEDIASTINAL LYMPHADENOPATHY: ICD-10-CM

## 2022-04-26 PROCEDURE — 71260 CT THORAX DX C+: CPT

## 2022-04-26 PROCEDURE — 6360000004 HC RX CONTRAST MEDICATION: Performed by: INTERNAL MEDICINE

## 2022-04-26 RX ADMIN — IOPAMIDOL 75 ML: 755 INJECTION, SOLUTION INTRAVENOUS at 08:22

## 2022-04-27 ENCOUNTER — OFFICE VISIT (OUTPATIENT)
Dept: CARDIOLOGY | Age: 62
End: 2022-04-27
Payer: COMMERCIAL

## 2022-04-27 VITALS
WEIGHT: 191.2 LBS | HEIGHT: 70 IN | BODY MASS INDEX: 27.37 KG/M2 | HEART RATE: 63 BPM | DIASTOLIC BLOOD PRESSURE: 64 MMHG | SYSTOLIC BLOOD PRESSURE: 124 MMHG

## 2022-04-27 DIAGNOSIS — R06.09 DYSPNEA ON EXERTION: Primary | ICD-10-CM

## 2022-04-27 DIAGNOSIS — I10 ESSENTIAL HYPERTENSION: ICD-10-CM

## 2022-04-27 PROCEDURE — 99214 OFFICE O/P EST MOD 30 MIN: CPT | Performed by: INTERNAL MEDICINE

## 2022-04-27 PROCEDURE — 93000 ELECTROCARDIOGRAM COMPLETE: CPT | Performed by: INTERNAL MEDICINE

## 2022-04-27 NOTE — PROGRESS NOTES
Today's Date: 4/27/2022  Patient's Name: Nixon Larose  Patient's age: 62 y.o., 1960    Subjective:  Nixon Larose is being seen in clinic today regarding   Chief Complaint   Patient presents with    6 Month Follow-Up   Dyspnea on exertion      he is here for follow up. He has chronic dyspnea on exertion and sees pulmonary. He denies any chest pain, no PND, no syncope or pre-syncope, no orthopnea. He report vertigo symptoms when look up or playing with grand kids while on floor. BP at home reported to be normal.    Past Medical History:   has a past medical history of Hx of measles, Hx of mumps, and Tobacco abuse. Past Surgical History:   has a past surgical history that includes Appendectomy (2003); Cholecystectomy (2010); and Colonoscopy (04/05/2012). Home Medications:  Prior to Admission medications    Medication Sig Start Date End Date Taking? Authorizing Provider   allopurinol (ZYLOPRIM) 100 MG tablet TAKE 1 TABLET BY MOUTH EVERY DAY 4/1/22  Yes Mp Montero MD   lisinopril (PRINIVIL;ZESTRIL) 10 MG tablet Take 1 tablet by mouth daily 4/1/22  Yes Mp Montero MD   omeprazole (PRILOSEC) 20 MG delayed release capsule Take 1 capsule by mouth every morning (before breakfast) 4/15/21  Yes Mp Montero MD   aspirin 81 MG tablet Take 81 mg by mouth daily   Yes Historical Provider, MD       Allergies:  Sulfa antibiotics    Social History:   reports that he has quit smoking. His smoking use included cigarettes. He has a 60.00 pack-year smoking history. He quit smokeless tobacco use about 17 months ago. He reports current alcohol use. He reports that he does not use drugs. Family History: family history includes Asthma in his father; Breast Cancer in his mother; Colon Cancer in his mother; Heart Disease in his brother; Heart Disease (age of onset: 37) in his brother; High Blood Pressure in his brother, brother, and sister; Other in his father. No h/o sudden cardiac death. No for premature CAD    REVIEW OF SYSTEMS:    · Constitutional: there has been no unanticipated weight loss. There's been No change in energy level, No change in activity level. · Eyes: No visual changes or diplopia. No scleral icterus. · ENT: No Headaches, hearing loss or vertigo. No mouth sores or sore throat. · Cardiovascular: see above  · Respiratory: see above  · Gastrointestinal: No abdominal pain, appetite loss, blood in stools. · Genitourinary: No dysuria, trouble voiding, or hematuria. · Musculoskeletal:  No gait disturbance, No weakness or joint complaints. · Integumentary: No rash or pruritis. · Neurological: No headache or diplopia. No tingling  · Psychiatric: No anxiety, or depression. · Endocrine: No temperature intolerance. · Hematologic/Lymphatic: No abnormal bruising or bleeding, blood clots or swollen lymph nodes. · Allergic/Immunologic: No nasal congestion or hives. PHYSICAL EXAM:      /64   Pulse 63   Ht 5' 10\" (1.778 m)   Wt 191 lb 3.2 oz (86.7 kg)   BMI 27.43 kg/m²    Constitutional and General Appearance: alert, cooperative, no distress and appears stated age  [de-identified]: PERRL, no cervical lymphadenopathy. No masses palpable. Normal oral mucosa  Respiratory:  · Normal excursion and expansion without use of accessory muscles  · Resp Auscultation: Good respiratory effort. No for increased work of breathing. On auscultation: clear to auscultation bilaterally  Cardiovascular:  · Heart tones are crisp and normal. regular S1 and S2.  · Jugular venous pulsation Normal  · The carotid upstroke is normal in amplitude and contour without delay or bruit   Abdomen:   · soft  · Bowel sounds present  Extremities:  ·  No edema  Neurological:  · Alert and oriented. Cardiac Data:  EKG: NSR, NL ECG    Labs:     CBC: No results for input(s): WBC, HGB, HCT, PLT in the last 72 hours. BMP: No results for input(s): NA, K, CO2, BUN, CREATININE, LABGLOM, GLUCOSE in the last 72 hours.   PT/INR: No results for input(s): PROTIME, INR in the last 72 hours. FASTING LIPID PANEL:  Lab Results   Component Value Date    HDL 41 04/16/2022    TRIG 131 04/16/2022     LIVER PROFILE:No results for input(s): AST, ALT, LABALBU in the last 72 hours. Problem List:  Patient Active Problem List   Diagnosis    History of tobacco use    Family history of cardiomyopathy    Dyspnea on exertion    Overweight (BMI 25.0-29. 9)        TTE 6/14/21  Summary  Normal left ventricular diameter. Hyperdynamic left ventricular function. Left ventricular ejection fraction 70 %. No doppler evidence of diastolic dysfunction. Normal structure and function of valves. No pericardial effusion.     Nuclear stress test 06/14/21  1-2 mm ST depression at 5.8 METS- exercise time 4 minutes     IMPRESSION:  1. Diaphragmatic attenuation inferiorly. 2. No obvious ischemia/infarction. 3. Normal left ventricular ejection fraction 61% with normal wall  Motion.     PFT 5/25/21 showed combined obstructive and restrictive ventilatory dysfunction of moderate severity      Coronary CTA 6/28/21  Unremarkable coronary CTA.       Normal left ventricular ejection fraction 71%. Assessment and plan:     -Dyspnea on exertion- Likely pulmonary in origin. PFT 5/25/21 showed combined obstructive and restrictive ventilatory dysfunction of moderate severity. Follows with pulmonary  -Family history of cardiomyopathy. TTE 6/14/21 showed LVEF 70% with no significant valve abnormalities.  -Unremarkable coronary CTA. Calcium score 0. 6/28/21  -HTN-stable. Continue lisinopril 10mg po qday. Patient to monitor BP at home. Side effects discussed. -GERD- on PPI  -Chronic smoking- he stopped smoking Thanksgiving 2020.    -I counseled him extensively towards symptoms for which he will need to seek emergent medical attention.   -RTC 12 months    Leah Chino [facility]  [phone]

## 2022-05-02 ENCOUNTER — HOSPITAL ENCOUNTER (OUTPATIENT)
Dept: PULMONOLOGY | Age: 62
Discharge: HOME | End: 2022-05-02
Payer: COMMERCIAL

## 2022-05-02 DIAGNOSIS — J43.2 CENTRILOBULAR EMPHYSEMA (HCC): ICD-10-CM

## 2022-05-02 LAB
DLCO %PRED: NORMAL
DLCO PRED: NORMAL
DLCO/VA %PRED: NORMAL
DLCO/VA PRED: NORMAL
DLCO/VA: NORMAL
DLCO: NORMAL
EXPIRATORY TIME-POST: NORMAL
EXPIRATORY TIME: NORMAL
FEF 25-75% %CHNG: NORMAL
FEF 25-75% %PRED-POST: NORMAL
FEF 25-75% %PRED-PRE: NORMAL
FEF 25-75% PRED: NORMAL
FEF 25-75%-POST: NORMAL
FEF 25-75%-PRE: NORMAL
FEV1 %PRED-POST: NORMAL
FEV1 %PRED-PRE: NORMAL
FEV1 PRED: NORMAL
FEV1-POST: NORMAL
FEV1-PRE: NORMAL
FEV1/FVC %PRED-POST: NORMAL
FEV1/FVC %PRED-PRE: NORMAL
FEV1/FVC PRED: NORMAL
FEV1/FVC-POST: NORMAL
FEV1/FVC-PRE: NORMAL
FVC %PRED-POST: NORMAL
FVC %PRED-PRE: NORMAL
FVC PRED: NORMAL
FVC-POST: NORMAL
FVC-PRE: NORMAL
GAW %PRED: NORMAL
GAW PRED: NORMAL
GAW: NORMAL
IC %PRED: NORMAL
IC PRED: NORMAL
IC: NORMAL
MEP: NORMAL
MIP: NORMAL
MVV %PRED-PRE: NORMAL
MVV PRED: NORMAL
MVV-PRE: NORMAL
PEF %PRED-POST: NORMAL
PEF %PRED-PRE: NORMAL
PEF PRED: NORMAL
PEF%CHNG: NORMAL
PEF-POST: NORMAL
PEF-PRE: NORMAL
RAW %PRED: NORMAL
RAW PRED: NORMAL
RAW: NORMAL
RV %PRED: NORMAL
RV PRED: NORMAL
RV: NORMAL
SVC %PRED: NORMAL
SVC PRED: NORMAL
SVC: NORMAL
TLC %PRED: NORMAL
TLC PRED: NORMAL
TLC: NORMAL
VA %PRED: NORMAL
VA PRED: NORMAL
VA: NORMAL
VTG %PRED: NORMAL
VTG PRED: NORMAL
VTG: NORMAL

## 2022-05-02 PROCEDURE — 94060 EVALUATION OF WHEEZING: CPT

## 2022-05-02 PROCEDURE — 94729 DIFFUSING CAPACITY: CPT

## 2022-05-02 PROCEDURE — 94640 AIRWAY INHALATION TREATMENT: CPT

## 2022-05-02 PROCEDURE — 94726 PLETHYSMOGRAPHY LUNG VOLUMES: CPT

## 2022-05-02 PROCEDURE — 6370000000 HC RX 637 (ALT 250 FOR IP): Performed by: INTERNAL MEDICINE

## 2022-05-02 RX ORDER — ALBUTEROL SULFATE 90 UG/1
4 AEROSOL, METERED RESPIRATORY (INHALATION) ONCE
Status: COMPLETED | OUTPATIENT
Start: 2022-05-02 | End: 2022-05-02

## 2022-05-02 RX ADMIN — ALBUTEROL SULFATE 4 PUFF: 90 AEROSOL, METERED RESPIRATORY (INHALATION) at 11:18

## 2022-05-11 NOTE — PROCEDURES
[facility] [address]                 [address] [place] [id number]                               PULMONARY FUNCTION    PATIENT NAME: Monique Rocha                      :        1960  MED REC NO:   [id number]                             ROOM:  ACCOUNT NO:   [de-identified]                           ADMIT DATE: 2022  PROVIDER:     Luci Eagle    DATE OF PROCEDURE:  2022    REFERRING PROVIDER: Humble Kyle MD    PRIMARY CARE PROVIDER: Leela Ramírez MD    INTERPRETATION:  Spirometry demonstrates a moderate obstructive  ventilatory defect. A significant bronchospastic component is  identified. FVC 3.06 liters (66%), FEV1 2.02 liters (56%), FEV1/FVC  66%. After bronchodilator, FEV1 increases to 2.72 liters (76%). Static  lung volumes as measured by body plethysmography demonstrate an increase  in RV and mild decrease in TLC. RV 2.95 liters (131%), TLC 5.47 liters  (78%), RV/TLC 54%. Diffusion capacity mildly decreased. DLCO 21.99  (68%). Airways resistance is increased. IMPRESSION:  The study is most consistent with a clinical diagnosis of  stage II COPD.         Josias Knowles    D: 2022 13:22:30       T: 2022 13:25:12     [email or web address]  Job#: [id number]     Doc#: [id number]    CC:    MD Humble Grant MD

## 2022-05-18 ENCOUNTER — OFFICE VISIT (OUTPATIENT)
Dept: PULMONOLOGY | Age: 62
End: 2022-05-18

## 2022-05-18 VITALS
DIASTOLIC BLOOD PRESSURE: 84 MMHG | TEMPERATURE: 97.7 F | BODY MASS INDEX: 27.35 KG/M2 | OXYGEN SATURATION: 97 % | WEIGHT: 191 LBS | HEART RATE: 61 BPM | HEIGHT: 70 IN | SYSTOLIC BLOOD PRESSURE: 126 MMHG

## 2022-05-18 DIAGNOSIS — I89.8 CALCIFIED LYMPH NODES: ICD-10-CM

## 2022-05-18 DIAGNOSIS — J44.9 STAGE 2 MODERATE COPD BY GOLD CLASSIFICATION (HCC): ICD-10-CM

## 2022-05-18 DIAGNOSIS — R59.0 MEDIASTINAL LYMPHADENOPATHY: Primary | ICD-10-CM

## 2022-05-18 DIAGNOSIS — J43.2 CENTRILOBULAR EMPHYSEMA (HCC): ICD-10-CM

## 2022-05-18 PROCEDURE — 99214 OFFICE O/P EST MOD 30 MIN: CPT | Performed by: INTERNAL MEDICINE

## 2022-05-18 PROCEDURE — 99213 OFFICE O/P EST LOW 20 MIN: CPT | Performed by: INTERNAL MEDICINE

## 2022-05-18 RX ORDER — ALBUTEROL SULFATE 90 UG/1
2 AEROSOL, METERED RESPIRATORY (INHALATION) EVERY 6 HOURS PRN
Qty: 18 G | Refills: 6 | Status: SHIPPED | OUTPATIENT
Start: 2022-05-18 | End: 2022-06-17

## 2022-05-18 RX ORDER — UMECLIDINIUM BROMIDE AND VILANTEROL TRIFENATATE 62.5; 25 UG/1; UG/1
1 POWDER RESPIRATORY (INHALATION) DAILY
Qty: 1 EACH | Refills: 5 | Status: SHIPPED | OUTPATIENT
Start: 2022-05-18

## 2022-05-18 ASSESSMENT — PATIENT HEALTH QUESTIONNAIRE - PHQ9
SUM OF ALL RESPONSES TO PHQ QUESTIONS 1-9: 0
2. FEELING DOWN, DEPRESSED OR HOPELESS: 0
SUM OF ALL RESPONSES TO PHQ9 QUESTIONS 1 & 2: 0
SUM OF ALL RESPONSES TO PHQ QUESTIONS 1-9: 0
1. LITTLE INTEREST OR PLEASURE IN DOING THINGS: 0

## 2022-05-18 NOTE — PROGRESS NOTES
REASON FOR THE CONSULTATION:  Mediastinal LAP   HISTORY OF PRESENT ILLNESS:    Jaida Granados is a 62y.o. year old male   CT imaging reviewed with patient and his wife  Differential reviewed in detail  Explained the process of mucous  PFT reviewed with  He did respond bronchodilator therapy during PFT testing  Hemoptysis or purulent sputum  No active wheezing    Last visit   Patient has chronic grade 2 dyspnea , which has been stable with one block walking   ,  Complains of Yes Cough , Nosputum production   , No  hemoptysis , No Associated with wheezing . No home oxygen  . Immunization   Immunization History   Administered Date(s) Administered    COVID-19, Admitly top, DILUTE for use, 12+ yrs, 30mcg/0.3mL dose 03/20/2021, 04/10/2021, 12/31/2021      PAST MEDICAL HISTORY:       Diagnosis Date    Hx of measles     Hx of mumps     Tobacco abuse          Family History:       Problem Relation Age of Onset    Colon Cancer Mother     Breast Cancer Mother     Asthma Father     Other Father         lung disease    High Blood Pressure Sister     High Blood Pressure Brother     High Blood Pressure Brother     Heart Disease Brother         hypertrophic cardiomyopathy    Heart Disease Brother 37        MI       SURGICAL HISTORY:   Past Surgical History:   Procedure Laterality Date    APPENDECTOMY  2003    CHOLECYSTECTOMY  2010    COLONOSCOPY  04/05/2012    1 polyp (hyperplastic), Dr. Miguel Champion, [place]:      There  No history of TB or TB exposure. There  Yes asbestos ,[place] dust exposure. The patient reports  No coal mine, [place], [place], [facility] exposure. History of travel outside the country No  There  is use of   marijuana No   VapingNo  IV heroinNo  Crack cocaineNo  There  Nohot tub exposure.    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 His smoking use included cigarettes. He has a 60.00 pack-year smoking history. He quit smokeless tobacco use about 17 months ago. ETOH:   reports current alcohol use. ALLERGIES:      Allergies   Allergen Reactions    Sulfa Antibiotics          Home Meds:   Prior to Admission medications    Medication Sig Start Date End Date Taking? Authorizing Provider   umeclidinium-vilanterol (ANORO ELLIPTA) 62.5-25 MCG/INH AEPB inhaler Inhale 1 puff into the lungs daily 5/18/22  Yes Humble Kyle MD   albuterol sulfate  (90 Base) MCG/ACT inhaler Inhale 2 puffs into the lungs every 6 hours as needed for Wheezing or Shortness of Breath 5/18/22 6/17/22 Yes Humble Kyle MD   allopurinol (ZYLOPRIM) 100 MG tablet TAKE 1 TABLET BY MOUTH EVERY DAY 4/1/22  Yes Leela Ramírez MD   lisinopril (PRINIVIL;ZESTRIL) 10 MG tablet Take 1 tablet by mouth daily 4/1/22  Yes Leela Ramírez MD   omeprazole (PRILOSEC) 20 MG delayed release capsule Take 1 capsule by mouth every morning (before breakfast) 4/15/21  Yes Leela Ramírez MD   aspirin 81 MG tablet Take 81 mg by mouth daily   Yes Historical Provider, MD              Review of Systems -  General ROS: negative for - chills, fatigue, fever or weight loss  ENT ROS: negative for - headaches, oral lesions or sore throat  Cardiovascular ROS: no chest pain , orthopnea or pnd   Gastrointestinal ROS: no abdominal pain, change in bowel habits, or black or bloody stools  Skin - no rash   Neuro - no blurry vision , no loc .  No focal weakness   msk - no jt tenderness or swelling    Vascular - no claudication , rest completed and negative   Lymphatic - complete and negative   Hematology - oncology - complete and negative   Allergy immunology - complete and negative    no burning or hematuria      Vitals:    05/18/22 1002   BP: 126/84   Pulse: 61   Temp: 97.7 °F (36.5 °C)   SpO2: 97% Head and neck atraumatic, normocephalic    Lymph nodes-no cervical, supraclavicular lymphadenopathy    Neck-no JVP elevation    Lungs - AP diameter of chest increased. Thoracic expansion and diaphragmatic excursion diminished. BS diminished and expiratory phase prolonged. No dullness to percussion or tenderness to palpation. No bronchial breath sounds . CVS- S1, S2 regular. No S3 no S4, no murmurs    Abdomen-nontender, nondistended. Bowel sounds are present. No organomegaly    Lower extremity-no edema    Upper extremity-no edema    Neurological-grossly normal cranial nerves. No overt motor deficit        No results found. CT Lung Screen (Annual)    Result Date: 2022  EXAMINATION: LOW DOSE SCREENING CT OF THE CHEST WITHOUT CONTRAST 2022 7:57 am TECHNIQUE: Low dose lung cancer screening CT of the chest was performed without the administration of intravenous contrast.  Multiplanar reformatted images are provided for review. Dose modulation, iterative reconstruction, and/or weight based adjustment of the mA/kV was utilized to reduce the radiation dose to as low as reasonably achievable. Field-of-view: 32 cm Dose Length Product: 84.20 mGy CTDlvol: 2.30 mGy COMPARISON: 2021 HISTORY: Screening. Patient Age: 61 y/o Number of pack years of smokin pack years If no longer smoking, number of years since cessation:  1.5 years Other symptoms:  None. Additional history: ORDERING SYSTEM PROVIDED HISTORY: Personal history of tobacco use TECHNOLOGIST PROVIDED HISTORY: Age: Patient is 62 y.o. Smoking History: Social History Tobacco Use Smoking status: Former Smoker Packs/day: 2.00 Years: 30.00 Pack years: 60 Types: Cigarettes Smokeless tobacco: Former User Quit date: 2020 Vaping Use Vaping Use: Never used Alcohol use: Yes Comment: 2 beers a month. Drug use: No Pack years: 61 Date of last lung cancer screenin2021 Is there documentation of shared decision making? ->Yes Is this a low dose CT or a routine CT?->Low Dose CT Is this the first (baseline) CT or an annual exam?->Annual Does the patient show any signs or symptoms of lung cancer? ->No Smoking Status? ->Former Smoker Date quit smoking? (must be within 15 years)->11/26/20 Smoking packs per day?->2 Years smoking?->30 Reason for Exam: Lung screening FINDINGS: Mediastinum:  Atherosclerotic calcification of the aorta and branch vasculature. Calcified mediastinal, subcarinal, and left infrahilar as well as paraesophageal lymph nodes. Enlarged right paratracheal lymph node measuring up to 3 cm, similar to 06/28/2021, but enlarged from 04/09/2021. No axillary lymphadenopathy. No pericardial or pleural effusions. No periaortic or mediastinal hemorrhage. Lungs/Pleura:  Trachea and proximal central airways appear patent. Mild dependent atelectasis and respiratory motion. Tree-in-bud opacities in the left lower lobe. Old granulomatous disease. Scattered calcified granulomata. Mild emphysema. Mild biapical pleuroparenchymal scarring. 3 mm stable subpleural pulmonary nodule in the left lower lobe on image 68, series 4, unchanged from 04/09/2021. Stable 4 mm subpleural left lower lobe pulmonary nodule on image 79, series 4. Calcified granuloma in the subpleural left lower lobe on image 64, series 4, stable. Upper Abdomen:  Prior cholecystectomy. Small hiatal hernia. Scattered calcifications throughout the spleen consistent with old granulomatous disease. Fatty liver. 1.5 cm stable left upper pole renal cyst on image 128, series 3. Soft Tissues/Bones: Mild multilevel degenerative changes in the spine. Multilevel Schmorl's node deformities. 1. Tree-in-bud opacities in the left lower lobe likely representing mild infiltrate or sequela of aspiration. Follow-up is recommended to document resolution. 2. Old granulomatous disease. Mild emphysema. Mild biapical pleuroparenchymal scarring. Mild dependent atelectasis and respiratory motion. Superimposed stable 3 mm and 4 mm left lower lobe subpleural pulmonary nodules. 3. Prior cholecystectomy. Fatty liver. 4. Small hiatal hernia. 5. 1.5 cm stable left upper pole renal cyst. 6. Enlarged right paratracheal lymph node measuring up to 3 cm, similar to 06/28/2021, but enlarged from 04/09/2021. Cause of this is indeterminate, but lymphoma or metastatic disease is difficult to exclude on the basis of this study. Close CT surveillance is recommended. The findings were sent to the Radiology Results [address] at 8:40 am on 4/11/2022to be communicated to a licensed caregiver. LUNG RADS: Per ACR Lung-RADS Version 1.1 Category 2, Benign appearance or behavior. Management:  Continue annual lung screening with LDCT in 12 months. Findings:  Modifier-may add on to category 0-4 coding. Management:  As appropriate to specific finding. RECOMMENDATIONS: If you would like to register your patient with the [facility] Nodule/Lung Cancer Screening Program, please contact the Nurse Navigator at [phone]). Ct chest w contrast 4/22  No acute cardiopulmonary process. Heterogeneous abnormal pre-MRI tracheal lymph node that is not significantly changed in size compared to prior examination. Short-term follow-up postcontrast CT of the chest is recommended in 3 months to assess stability. Alternatively a PET-CT may be helpful in further characterization. IMPRESSION:     Diagnosis Orders   1. Mediastinal lymphadenopathy  PET CT SKULL BASE TO MID THIGH   2. Centrilobular emphysema (HCC) mild  umeclidinium-vilanterol (ANORO ELLIPTA) 62.5-25 MCG/INH AEPB inhaler    albuterol sulfate  (90 Base) MCG/ACT inhaler   3. Stage 2 moderate COPD by GOLD classification (HCC)  umeclidinium-vilanterol (ANORO ELLIPTA) 62.5-25 MCG/INH AEPB inhaler    albuterol sulfate  (90 Base) MCG/ACT inhaler   4.  Calcified lymph nodes mediastinal          :                PLAN:      CT chest with contrast reviewed right pretracheal lymph node is stable but enlarged. There are calcified mediastinal lymph nodes. Differential of this enlarged pretracheal lymph node is sarcoid versus related to histoplasma versus neoplastic. Due to the stability of the lymph node doubt this is neoplastic but the possibility remains because of the history of smoking and COPD and emphysema . Will obtain PET CT scan and based on results we will plan EBUS-TB NA   Discussed with patient and his wife in detail   Will start on Anoro. Patient has significant bronchospastic component. Follow-up after PET scan        Requested Prescriptions     Signed Prescriptions Disp Refills    umeclidinium-vilanterol (ANORO ELLIPTA) 62.5-25 MCG/INH AEPB inhaler 1 each 5     Sig: Inhale 1 puff into the lungs daily    albuterol sulfate  (90 Base) MCG/ACT inhaler 18 g 6     Sig: Inhale 2 puffs into the lungs every 6 hours as needed for Wheezing or Shortness of Breath       There are no discontinued medications. Danny received counseling on the following healthy behaviors: nutrition, exercise and medication adherence    Patient given educational materials : see patient instruction       Discussed use, benefit, and side effects of prescribed medications. Barriers to medication compliance addressed. All patient questions answered. Pt voiced understanding. I hope this updates you on my evaluation and clinical thinking. Thank you for allowing me to participate in his care. Sincerely,      Electronically signed by Jeremie Matamoros MD on 5/18/2022 at 12:36 PM       Please note that this chart was generated using voice recognition Dragon dictation software. Although every effort was made to ensure the accuracy of this automated transcription, some errors in transcription may have occurred.

## 2022-07-27 PROBLEM — J44.9 COPD (CHRONIC OBSTRUCTIVE PULMONARY DISEASE) (HCC): Status: ACTIVE | Noted: 2022-07-27

## 2022-09-29 ENCOUNTER — TELEPHONE (OUTPATIENT)
Dept: FAMILY MEDICINE CLINIC | Age: 62
End: 2022-09-29

## 2022-10-06 ENCOUNTER — TELEPHONE (OUTPATIENT)
Dept: FAMILY MEDICINE CLINIC | Age: 62
End: 2022-10-06

## 2022-10-06 NOTE — TELEPHONE ENCOUNTER
Attempted to reach patient regarding missed appointment on 10/6/22. Unable to contact at this time. Left message to reschedule.

## 2023-03-17 ENCOUNTER — TELEPHONE (OUTPATIENT)
Dept: ONCOLOGY | Age: 63
End: 2023-03-17

## 2024-08-15 ENCOUNTER — COMMUNITY OUTREACH (OUTPATIENT)
Dept: FAMILY MEDICINE CLINIC | Age: 64
End: 2024-08-15

## 2024-08-15 NOTE — PROGRESS NOTES
Patient's HM shows they are overdue for Colorectal Screening.   Care Everywhere and  files searched.   updated with 11/20/23 Colonoscopy.
